# Patient Record
Sex: FEMALE | Race: WHITE | Employment: UNEMPLOYED | URBAN - METROPOLITAN AREA
[De-identification: names, ages, dates, MRNs, and addresses within clinical notes are randomized per-mention and may not be internally consistent; named-entity substitution may affect disease eponyms.]

---

## 2017-09-11 ENCOUNTER — HOSPITAL ENCOUNTER (OUTPATIENT)
Dept: ULTRASOUND IMAGING | Age: 22
Discharge: HOME | End: 2017-09-11
Attending: EMERGENCY MEDICINE | Admitting: EMERGENCY MEDICINE

## 2017-09-11 DIAGNOSIS — E04.1 THYROID NODULE: ICD-10-CM

## 2017-09-11 DIAGNOSIS — E04.1 NONTOXIC SINGLE THYROID NODULE: ICD-10-CM

## 2018-10-06 ENCOUNTER — HOSPITAL ENCOUNTER (INPATIENT)
Age: 23
LOS: 3 days | Discharge: HOME | DRG: 540 | End: 2018-10-09
Attending: OBSTETRICS & GYNECOLOGY | Admitting: OBSTETRICS & GYNECOLOGY
Payer: MEDICARE

## 2018-10-06 ENCOUNTER — ANESTHESIA (OUTPATIENT)
Dept: LABOR AND DELIVERY | Age: 23
DRG: 540 | End: 2018-10-06
Payer: MEDICARE

## 2018-10-06 ENCOUNTER — ANESTHESIA EVENT (OUTPATIENT)
Dept: LABOR AND DELIVERY | Age: 23
DRG: 540 | End: 2018-10-06
Payer: MEDICARE

## 2018-10-06 DIAGNOSIS — Z87.59 CESAREAN DELIV DUE TO PREVIOUS DIFFICULT DELIV, DELIV, CURR HOSPITALIZ: Primary | ICD-10-CM

## 2018-10-06 LAB
ABO/RH: NORMAL
AMPHETAMINE SCREEN, URINE: NORMAL
ANTIBODY SCREEN: NORMAL
BARBITURATE SCREEN URINE: NORMAL
BASOPHILS ABSOLUTE: 0 K/UL (ref 0–0.2)
BASOPHILS RELATIVE PERCENT: 0.3 %
BENZODIAZEPINE SCREEN, URINE: NORMAL
BUPRENORPHINE URINE: NORMAL
CANNABINOID SCREEN URINE: NORMAL
COCAINE METABOLITE SCREEN URINE: NORMAL
EOSINOPHILS ABSOLUTE: 0.1 K/UL (ref 0–0.6)
EOSINOPHILS RELATIVE PERCENT: 0.8 %
HCT VFR BLD CALC: 31.6 % (ref 36–48)
HEMOGLOBIN: 10.2 G/DL (ref 12–16)
HEPATITIS B SURFACE ANTIGEN INTERPRETATION: NORMAL
LYMPHOCYTES ABSOLUTE: 2.1 K/UL (ref 1–5.1)
LYMPHOCYTES RELATIVE PERCENT: 17.8 %
Lab: NORMAL
MCH RBC QN AUTO: 27.7 PG (ref 26–34)
MCHC RBC AUTO-ENTMCNC: 32.4 G/DL (ref 31–36)
MCV RBC AUTO: 85.4 FL (ref 80–100)
METHADONE SCREEN, URINE: NORMAL
MONOCYTES ABSOLUTE: 0.5 K/UL (ref 0–1.3)
MONOCYTES RELATIVE PERCENT: 3.9 %
NEUTROPHILS ABSOLUTE: 9.2 K/UL (ref 1.7–7.7)
NEUTROPHILS RELATIVE PERCENT: 77.2 %
OPIATE SCREEN URINE: NORMAL
OXYCODONE URINE: NORMAL
PDW BLD-RTO: 18 % (ref 12.4–15.4)
PH UA: 6
PHENCYCLIDINE SCREEN URINE: NORMAL
PLATELET # BLD: 182 K/UL (ref 135–450)
PMV BLD AUTO: 8.4 FL (ref 5–10.5)
PROPOXYPHENE SCREEN: NORMAL
RBC # BLD: 3.69 M/UL (ref 4–5.2)
RUBELLA ANTIBODY IGG: 118.2 IU/ML
TOTAL SYPHILLIS IGG/IGM: NORMAL
WBC # BLD: 11.9 K/UL (ref 4–11)

## 2018-10-06 PROCEDURE — 59514 CESAREAN DELIVERY ONLY: CPT | Performed by: OBSTETRICS & GYNECOLOGY

## 2018-10-06 PROCEDURE — 7100000001 HC PACU RECOVERY - ADDTL 15 MIN: Performed by: OBSTETRICS & GYNECOLOGY

## 2018-10-06 PROCEDURE — 86850 RBC ANTIBODY SCREEN: CPT

## 2018-10-06 PROCEDURE — 58740 ADHESIOLYSIS TUBE OVARY: CPT | Performed by: OBSTETRICS & GYNECOLOGY

## 2018-10-06 PROCEDURE — 2500000003 HC RX 250 WO HCPCS: Performed by: NURSE ANESTHETIST, CERTIFIED REGISTERED

## 2018-10-06 PROCEDURE — 2580000003 HC RX 258: Performed by: OBSTETRICS & GYNECOLOGY

## 2018-10-06 PROCEDURE — 87340 HEPATITIS B SURFACE AG IA: CPT

## 2018-10-06 PROCEDURE — 99223 1ST HOSP IP/OBS HIGH 75: CPT | Performed by: OBSTETRICS & GYNECOLOGY

## 2018-10-06 PROCEDURE — 86923 COMPATIBILITY TEST ELECTRIC: CPT

## 2018-10-06 PROCEDURE — 3700000001 HC ADD 15 MINUTES (ANESTHESIA): Performed by: OBSTETRICS & GYNECOLOGY

## 2018-10-06 PROCEDURE — 6360000002 HC RX W HCPCS: Performed by: OBSTETRICS & GYNECOLOGY

## 2018-10-06 PROCEDURE — 6360000002 HC RX W HCPCS

## 2018-10-06 PROCEDURE — 2709999900 HC NON-CHARGEABLE SUPPLY: Performed by: OBSTETRICS & GYNECOLOGY

## 2018-10-06 PROCEDURE — 86900 BLOOD TYPING SEROLOGIC ABO: CPT

## 2018-10-06 PROCEDURE — S0028 INJECTION, FAMOTIDINE, 20 MG: HCPCS | Performed by: OBSTETRICS & GYNECOLOGY

## 2018-10-06 PROCEDURE — 86702 HIV-2 ANTIBODY: CPT

## 2018-10-06 PROCEDURE — 3700000000 HC ANESTHESIA ATTENDED CARE: Performed by: OBSTETRICS & GYNECOLOGY

## 2018-10-06 PROCEDURE — 6360000002 HC RX W HCPCS: Performed by: NURSE ANESTHETIST, CERTIFIED REGISTERED

## 2018-10-06 PROCEDURE — 87390 HIV-1 AG IA: CPT

## 2018-10-06 PROCEDURE — 2500000003 HC RX 250 WO HCPCS: Performed by: OBSTETRICS & GYNECOLOGY

## 2018-10-06 PROCEDURE — 86701 HIV-1ANTIBODY: CPT

## 2018-10-06 PROCEDURE — 7100000000 HC PACU RECOVERY - FIRST 15 MIN: Performed by: OBSTETRICS & GYNECOLOGY

## 2018-10-06 PROCEDURE — 3609079900 HC CESAREAN SECTION: Performed by: OBSTETRICS & GYNECOLOGY

## 2018-10-06 PROCEDURE — 86901 BLOOD TYPING SEROLOGIC RH(D): CPT

## 2018-10-06 PROCEDURE — 59025 FETAL NON-STRESS TEST: CPT

## 2018-10-06 PROCEDURE — 6370000000 HC RX 637 (ALT 250 FOR IP): Performed by: OBSTETRICS & GYNECOLOGY

## 2018-10-06 PROCEDURE — 80307 DRUG TEST PRSMV CHEM ANLYZR: CPT

## 2018-10-06 PROCEDURE — 1220000000 HC SEMI PRIVATE OB R&B

## 2018-10-06 PROCEDURE — 6370000000 HC RX 637 (ALT 250 FOR IP): Performed by: NURSE ANESTHETIST, CERTIFIED REGISTERED

## 2018-10-06 PROCEDURE — 86762 RUBELLA ANTIBODY: CPT

## 2018-10-06 PROCEDURE — 86780 TREPONEMA PALLIDUM: CPT

## 2018-10-06 PROCEDURE — 85025 COMPLETE CBC W/AUTO DIFF WBC: CPT

## 2018-10-06 RX ORDER — SODIUM CHLORIDE 0.9 % (FLUSH) 0.9 %
10 SYRINGE (ML) INJECTION PRN
Status: DISCONTINUED | OUTPATIENT
Start: 2018-10-06 | End: 2018-10-06

## 2018-10-06 RX ORDER — FENTANYL CITRATE 50 UG/ML
INJECTION, SOLUTION INTRAMUSCULAR; INTRAVENOUS PRN
Status: DISCONTINUED | OUTPATIENT
Start: 2018-10-06 | End: 2018-10-07 | Stop reason: SDUPTHER

## 2018-10-06 RX ORDER — OXYCODONE HYDROCHLORIDE AND ACETAMINOPHEN 5; 325 MG/1; MG/1
1 TABLET ORAL EVERY 4 HOURS PRN
Status: DISCONTINUED | OUTPATIENT
Start: 2018-10-06 | End: 2018-10-09 | Stop reason: HOSPADM

## 2018-10-06 RX ORDER — METOCLOPRAMIDE HYDROCHLORIDE 5 MG/ML
10 INJECTION INTRAMUSCULAR; INTRAVENOUS ONCE
Status: COMPLETED | OUTPATIENT
Start: 2018-10-06 | End: 2018-10-06

## 2018-10-06 RX ORDER — SODIUM CHLORIDE, SODIUM LACTATE, POTASSIUM CHLORIDE, CALCIUM CHLORIDE 600; 310; 30; 20 MG/100ML; MG/100ML; MG/100ML; MG/100ML
INJECTION, SOLUTION INTRAVENOUS CONTINUOUS
Status: DISCONTINUED | OUTPATIENT
Start: 2018-10-06 | End: 2018-10-09 | Stop reason: HOSPADM

## 2018-10-06 RX ORDER — NALOXONE HYDROCHLORIDE 0.4 MG/ML
0.4 INJECTION, SOLUTION INTRAMUSCULAR; INTRAVENOUS; SUBCUTANEOUS PRN
Status: DISCONTINUED | OUTPATIENT
Start: 2018-10-06 | End: 2018-10-09 | Stop reason: HOSPADM

## 2018-10-06 RX ORDER — ACETAMINOPHEN 10 MG/ML
1000 INJECTION, SOLUTION INTRAVENOUS ONCE
Status: COMPLETED | OUTPATIENT
Start: 2018-10-06 | End: 2018-10-06

## 2018-10-06 RX ORDER — METOCLOPRAMIDE HYDROCHLORIDE 5 MG/ML
10 INJECTION INTRAMUSCULAR; INTRAVENOUS ONCE
Status: DISCONTINUED | OUTPATIENT
Start: 2018-10-06 | End: 2018-10-06 | Stop reason: SDUPTHER

## 2018-10-06 RX ORDER — 0.9 % SODIUM CHLORIDE 0.9 %
250 INTRAVENOUS SOLUTION INTRAVENOUS ONCE
Status: DISCONTINUED | OUTPATIENT
Start: 2018-10-06 | End: 2018-10-09 | Stop reason: HOSPADM

## 2018-10-06 RX ORDER — SODIUM CHLORIDE 0.9 % (FLUSH) 0.9 %
SYRINGE (ML) INJECTION
Status: DISPENSED
Start: 2018-10-06 | End: 2018-10-07

## 2018-10-06 RX ORDER — SODIUM CHLORIDE 0.9 % (FLUSH) 0.9 %
10 SYRINGE (ML) INJECTION PRN
Status: DISCONTINUED | OUTPATIENT
Start: 2018-10-06 | End: 2018-10-09 | Stop reason: HOSPADM

## 2018-10-06 RX ORDER — OXYCODONE HYDROCHLORIDE AND ACETAMINOPHEN 5; 325 MG/1; MG/1
2 TABLET ORAL EVERY 4 HOURS PRN
Status: DISCONTINUED | OUTPATIENT
Start: 2018-10-06 | End: 2018-10-06

## 2018-10-06 RX ORDER — DIPHENHYDRAMINE HYDROCHLORIDE 50 MG/ML
25 INJECTION INTRAMUSCULAR; INTRAVENOUS EVERY 6 HOURS PRN
Status: DISCONTINUED | OUTPATIENT
Start: 2018-10-06 | End: 2018-10-09 | Stop reason: HOSPADM

## 2018-10-06 RX ORDER — SODIUM CHLORIDE, SODIUM LACTATE, POTASSIUM CHLORIDE, CALCIUM CHLORIDE 600; 310; 30; 20 MG/100ML; MG/100ML; MG/100ML; MG/100ML
INJECTION, SOLUTION INTRAVENOUS CONTINUOUS
Status: DISCONTINUED | OUTPATIENT
Start: 2018-10-06 | End: 2018-10-06

## 2018-10-06 RX ORDER — IBUPROFEN 400 MG/1
800 TABLET ORAL EVERY 8 HOURS PRN
Status: DISCONTINUED | OUTPATIENT
Start: 2018-10-06 | End: 2018-10-09 | Stop reason: HOSPADM

## 2018-10-06 RX ORDER — LANOLIN 100 %
OINTMENT (GRAM) TOPICAL
Status: DISCONTINUED | OUTPATIENT
Start: 2018-10-06 | End: 2018-10-09 | Stop reason: HOSPADM

## 2018-10-06 RX ORDER — SODIUM CHLORIDE, SODIUM LACTATE, POTASSIUM CHLORIDE, CALCIUM CHLORIDE 600; 310; 30; 20 MG/100ML; MG/100ML; MG/100ML; MG/100ML
INJECTION, SOLUTION INTRAVENOUS
Status: DISPENSED
Start: 2018-10-06 | End: 2018-10-07

## 2018-10-06 RX ORDER — SODIUM CHLORIDE 0.9 % (FLUSH) 0.9 %
10 SYRINGE (ML) INJECTION EVERY 12 HOURS SCHEDULED
Status: DISCONTINUED | OUTPATIENT
Start: 2018-10-06 | End: 2018-10-09 | Stop reason: HOSPADM

## 2018-10-06 RX ORDER — ONDANSETRON 2 MG/ML
4 INJECTION INTRAMUSCULAR; INTRAVENOUS EVERY 6 HOURS PRN
Status: DISCONTINUED | OUTPATIENT
Start: 2018-10-06 | End: 2018-10-09 | Stop reason: HOSPADM

## 2018-10-06 RX ORDER — TRISODIUM CITRATE DIHYDRATE AND CITRIC ACID MONOHYDRATE 500; 334 MG/5ML; MG/5ML
30 SOLUTION ORAL ONCE
Status: COMPLETED | OUTPATIENT
Start: 2018-10-06 | End: 2018-10-06

## 2018-10-06 RX ORDER — SODIUM CHLORIDE 0.9 % (FLUSH) 0.9 %
10 SYRINGE (ML) INJECTION EVERY 12 HOURS SCHEDULED
Status: DISCONTINUED | OUTPATIENT
Start: 2018-10-06 | End: 2018-10-06

## 2018-10-06 RX ORDER — MIDAZOLAM HYDROCHLORIDE 1 MG/ML
INJECTION INTRAMUSCULAR; INTRAVENOUS PRN
Status: DISCONTINUED | OUTPATIENT
Start: 2018-10-06 | End: 2018-10-07 | Stop reason: SDUPTHER

## 2018-10-06 RX ORDER — ACETAMINOPHEN 325 MG/1
650 TABLET ORAL EVERY 4 HOURS PRN
Status: DISCONTINUED | OUTPATIENT
Start: 2018-10-06 | End: 2018-10-09 | Stop reason: HOSPADM

## 2018-10-06 RX ORDER — EPHEDRINE SULFATE 50 MG/ML
INJECTION, SOLUTION INTRAVENOUS PRN
Status: DISCONTINUED | OUTPATIENT
Start: 2018-10-06 | End: 2018-10-07 | Stop reason: SDUPTHER

## 2018-10-06 RX ADMIN — SODIUM CHLORIDE, POTASSIUM CHLORIDE, SODIUM LACTATE AND CALCIUM CHLORIDE: 600; 310; 30; 20 INJECTION, SOLUTION INTRAVENOUS at 12:20

## 2018-10-06 RX ADMIN — SODIUM CHLORIDE, POTASSIUM CHLORIDE, SODIUM LACTATE AND CALCIUM CHLORIDE: 600; 310; 30; 20 INJECTION, SOLUTION INTRAVENOUS at 13:00

## 2018-10-06 RX ADMIN — Medication 2 G: at 13:37

## 2018-10-06 RX ADMIN — FAMOTIDINE 20 MG: 10 INJECTION, SOLUTION INTRAVENOUS at 13:00

## 2018-10-06 RX ADMIN — ONDANSETRON 4 MG: 2 INJECTION INTRAMUSCULAR; INTRAVENOUS at 19:37

## 2018-10-06 RX ADMIN — SODIUM CITRATE AND CITRIC ACID MONOHYDRATE 30 ML: 500; 334 SOLUTION ORAL at 13:02

## 2018-10-06 RX ADMIN — EPHEDRINE SULFATE 10 MG: 50 INJECTION INTRAMUSCULAR; INTRAVENOUS; SUBCUTANEOUS at 13:38

## 2018-10-06 RX ADMIN — FENTANYL CITRATE 90 MCG: 50 INJECTION INTRAMUSCULAR; INTRAVENOUS at 13:50

## 2018-10-06 RX ADMIN — ACETAMINOPHEN 1000 MG: 10 INJECTION, SOLUTION INTRAVENOUS at 18:41

## 2018-10-06 RX ADMIN — MIDAZOLAM 1 MG: 1 INJECTION INTRAMUSCULAR; INTRAVENOUS at 13:30

## 2018-10-06 RX ADMIN — IBUPROFEN 800 MG: 400 TABLET, FILM COATED ORAL at 22:04

## 2018-10-06 RX ADMIN — SODIUM CHLORIDE, POTASSIUM CHLORIDE, SODIUM LACTATE AND CALCIUM CHLORIDE: 600; 310; 30; 20 INJECTION, SOLUTION INTRAVENOUS at 18:40

## 2018-10-06 RX ADMIN — METOCLOPRAMIDE 10 MG: 5 INJECTION, SOLUTION INTRAMUSCULAR; INTRAVENOUS at 13:00

## 2018-10-06 RX ADMIN — MIDAZOLAM 1 MG: 1 INJECTION INTRAMUSCULAR; INTRAVENOUS at 13:15

## 2018-10-06 ASSESSMENT — PULMONARY FUNCTION TESTS
PIF_VALUE: 0

## 2018-10-06 ASSESSMENT — PAIN SCALES - GENERAL: PAINLEVEL_OUTOF10: 6

## 2018-10-06 NOTE — PLAN OF CARE
Problem: Urinary Retention:  Intervention: Manage urinary catheter  Culp cath draining with gravity.

## 2018-10-07 LAB
HCT VFR BLD CALC: 24.1 % (ref 36–48)
HCT VFR BLD CALC: 24.7 % (ref 36–48)
HEMOGLOBIN: 7.7 G/DL (ref 12–16)
HEMOGLOBIN: 7.9 G/DL (ref 12–16)
HEPATITIS C ANTIBODY INTERPRETATION: NORMAL
HIV AG/AB: NORMAL
HIV ANTIGEN: NORMAL
HIV-1 ANTIBODY: NORMAL
HIV-2 AB: NORMAL
MCH RBC QN AUTO: 27.4 PG (ref 26–34)
MCH RBC QN AUTO: 27.7 PG (ref 26–34)
MCHC RBC AUTO-ENTMCNC: 31.9 G/DL (ref 31–36)
MCHC RBC AUTO-ENTMCNC: 32.1 G/DL (ref 31–36)
MCV RBC AUTO: 85.9 FL (ref 80–100)
MCV RBC AUTO: 86.2 FL (ref 80–100)
PDW BLD-RTO: 18.3 % (ref 12.4–15.4)
PDW BLD-RTO: 18.6 % (ref 12.4–15.4)
PLATELET # BLD: 131 K/UL (ref 135–450)
PLATELET # BLD: 150 K/UL (ref 135–450)
PMV BLD AUTO: 7.9 FL (ref 5–10.5)
PMV BLD AUTO: 8.2 FL (ref 5–10.5)
RBC # BLD: 2.79 M/UL (ref 4–5.2)
RBC # BLD: 2.88 M/UL (ref 4–5.2)
WBC # BLD: 11 K/UL (ref 4–11)
WBC # BLD: 11.1 K/UL (ref 4–11)

## 2018-10-07 PROCEDURE — 99024 POSTOP FOLLOW-UP VISIT: CPT | Performed by: OBSTETRICS & GYNECOLOGY

## 2018-10-07 PROCEDURE — 85027 COMPLETE CBC AUTOMATED: CPT

## 2018-10-07 PROCEDURE — 6370000000 HC RX 637 (ALT 250 FOR IP): Performed by: OBSTETRICS & GYNECOLOGY

## 2018-10-07 PROCEDURE — 94664 DEMO&/EVAL PT USE INHALER: CPT

## 2018-10-07 PROCEDURE — 86803 HEPATITIS C AB TEST: CPT

## 2018-10-07 PROCEDURE — 1220000000 HC SEMI PRIVATE OB R&B

## 2018-10-07 RX ORDER — POLYETHYLENE GLYCOL 3350 17 G/17G
17 POWDER, FOR SOLUTION ORAL DAILY
Status: DISCONTINUED | OUTPATIENT
Start: 2018-10-07 | End: 2018-10-09 | Stop reason: HOSPADM

## 2018-10-07 RX ADMIN — IBUPROFEN 800 MG: 400 TABLET, FILM COATED ORAL at 06:39

## 2018-10-07 RX ADMIN — IBUPROFEN 800 MG: 400 TABLET, FILM COATED ORAL at 23:49

## 2018-10-07 RX ADMIN — IBUPROFEN 800 MG: 400 TABLET, FILM COATED ORAL at 14:54

## 2018-10-07 RX ADMIN — OXYCODONE AND ACETAMINOPHEN 1 TABLET: 5; 325 TABLET ORAL at 01:24

## 2018-10-07 RX ADMIN — OXYCODONE AND ACETAMINOPHEN 1 TABLET: 5; 325 TABLET ORAL at 11:36

## 2018-10-07 RX ADMIN — OXYCODONE AND ACETAMINOPHEN 1 TABLET: 5; 325 TABLET ORAL at 16:07

## 2018-10-07 RX ADMIN — POLYETHYLENE GLYCOL 3350 17 G: 17 POWDER, FOR SOLUTION ORAL at 08:13

## 2018-10-07 RX ADMIN — OXYCODONE AND ACETAMINOPHEN 1 TABLET: 5; 325 TABLET ORAL at 06:39

## 2018-10-07 RX ADMIN — OXYCODONE AND ACETAMINOPHEN 1 TABLET: 5; 325 TABLET ORAL at 20:12

## 2018-10-07 ASSESSMENT — PAIN SCALES - GENERAL
PAINLEVEL_OUTOF10: 8
PAINLEVEL_OUTOF10: 7
PAINLEVEL_OUTOF10: 6
PAINLEVEL_OUTOF10: 4
PAINLEVEL_OUTOF10: 8
PAINLEVEL_OUTOF10: 7
PAINLEVEL_OUTOF10: 7
PAINLEVEL_OUTOF10: 10

## 2018-10-07 ASSESSMENT — PAIN DESCRIPTION - PROGRESSION
CLINICAL_PROGRESSION: GRADUALLY IMPROVING

## 2018-10-07 ASSESSMENT — PAIN DESCRIPTION - ORIENTATION: ORIENTATION: LOWER

## 2018-10-07 ASSESSMENT — PAIN DESCRIPTION - DESCRIPTORS: DESCRIPTORS: ACHING;CRAMPING;DISCOMFORT;DULL;SORE

## 2018-10-07 ASSESSMENT — PAIN DESCRIPTION - PAIN TYPE: TYPE: ACUTE PAIN;SURGICAL PAIN

## 2018-10-07 ASSESSMENT — PAIN DESCRIPTION - FREQUENCY: FREQUENCY: INTERMITTENT

## 2018-10-07 ASSESSMENT — PAIN DESCRIPTION - LOCATION: LOCATION: ABDOMEN

## 2018-10-07 NOTE — FLOWSHEET NOTE
Spoke with anesthesia Poppy Sullivan and Dr. Savanah Ponce. Okay to give po motrin and percocet at this time.

## 2018-10-07 NOTE — FLOWSHEET NOTE
RN to room to introduce self to patient and update board. Mother concerned with infant hypoglycemia. Questions answered and informed mom will keep up to date with all interventions and treatment planned.

## 2018-10-08 PROCEDURE — 1220000000 HC SEMI PRIVATE OB R&B

## 2018-10-08 PROCEDURE — 6370000000 HC RX 637 (ALT 250 FOR IP): Performed by: OBSTETRICS & GYNECOLOGY

## 2018-10-08 PROCEDURE — 90471 IMMUNIZATION ADMIN: CPT | Performed by: OBSTETRICS & GYNECOLOGY

## 2018-10-08 PROCEDURE — 6360000002 HC RX W HCPCS: Performed by: OBSTETRICS & GYNECOLOGY

## 2018-10-08 PROCEDURE — 0DNW0ZZ RELEASE PERITONEUM, OPEN APPROACH: ICD-10-PCS | Performed by: OBSTETRICS & GYNECOLOGY

## 2018-10-08 PROCEDURE — 90715 TDAP VACCINE 7 YRS/> IM: CPT | Performed by: OBSTETRICS & GYNECOLOGY

## 2018-10-08 RX ORDER — MAGNESIUM HYDROXIDE/ALUMINUM HYDROXICE/SIMETHICONE 120; 1200; 1200 MG/30ML; MG/30ML; MG/30ML
30 SUSPENSION ORAL ONCE
Status: COMPLETED | OUTPATIENT
Start: 2018-10-08 | End: 2018-10-08

## 2018-10-08 RX ORDER — OXYCODONE HYDROCHLORIDE AND ACETAMINOPHEN 5; 325 MG/1; MG/1
2 TABLET ORAL EVERY 4 HOURS PRN
Status: DISCONTINUED | OUTPATIENT
Start: 2018-10-08 | End: 2018-10-09 | Stop reason: HOSPADM

## 2018-10-08 RX ADMIN — IBUPROFEN 800 MG: 400 TABLET, FILM COATED ORAL at 10:46

## 2018-10-08 RX ADMIN — OXYCODONE AND ACETAMINOPHEN 1 TABLET: 5; 325 TABLET ORAL at 00:09

## 2018-10-08 RX ADMIN — ALUMINUM HYDROXIDE, MAGNESIUM HYDROXIDE, AND SIMETHICONE 30 ML: 200; 200; 20 SUSPENSION ORAL at 22:03

## 2018-10-08 RX ADMIN — IBUPROFEN 800 MG: 400 TABLET, FILM COATED ORAL at 10:47

## 2018-10-08 RX ADMIN — OXYCODONE AND ACETAMINOPHEN 1 TABLET: 5; 325 TABLET ORAL at 10:59

## 2018-10-08 RX ADMIN — OXYCODONE HYDROCHLORIDE AND ACETAMINOPHEN 2 TABLET: 5; 325 TABLET ORAL at 19:12

## 2018-10-08 RX ADMIN — OXYCODONE AND ACETAMINOPHEN 1 TABLET: 5; 325 TABLET ORAL at 05:27

## 2018-10-08 RX ADMIN — IBUPROFEN 800 MG: 400 TABLET, FILM COATED ORAL at 19:13

## 2018-10-08 RX ADMIN — POLYETHYLENE GLYCOL 3350 17 G: 17 POWDER, FOR SOLUTION ORAL at 10:45

## 2018-10-08 RX ADMIN — TETANUS TOXOID, REDUCED DIPHTHERIA TOXOID AND ACELLULAR PERTUSSIS VACCINE, ADSORBED 0.5 ML: 5; 2.5; 8; 8; 2.5 SUSPENSION INTRAMUSCULAR at 15:23

## 2018-10-08 RX ADMIN — OXYCODONE HYDROCHLORIDE AND ACETAMINOPHEN 2 TABLET: 5; 325 TABLET ORAL at 15:22

## 2018-10-08 RX ADMIN — OXYCODONE AND ACETAMINOPHEN 1 TABLET: 5; 325 TABLET ORAL at 10:45

## 2018-10-08 ASSESSMENT — PAIN SCALES - GENERAL
PAINLEVEL_OUTOF10: 9
PAINLEVEL_OUTOF10: 5
PAINLEVEL_OUTOF10: 8
PAINLEVEL_OUTOF10: 0
PAINLEVEL_OUTOF10: 7
PAINLEVEL_OUTOF10: 7

## 2018-10-08 ASSESSMENT — PAIN DESCRIPTION - ORIENTATION: ORIENTATION: MID;LOWER

## 2018-10-08 ASSESSMENT — PAIN DESCRIPTION - PAIN TYPE: TYPE: SURGICAL PAIN

## 2018-10-08 ASSESSMENT — PAIN DESCRIPTION - DESCRIPTORS: DESCRIPTORS: ACHING;CRAMPING

## 2018-10-08 ASSESSMENT — PAIN DESCRIPTION - LOCATION: LOCATION: ABDOMEN

## 2018-10-08 ASSESSMENT — PAIN DESCRIPTION - FREQUENCY: FREQUENCY: INTERMITTENT

## 2018-10-08 NOTE — FLOWSHEET NOTE
Patient complaining of mid upper abdominal pain that was there prior to delivery but is worse right now. Passing flatus. Hypoactive bowel sounds. Has not had bowel movement post delivery. Dr. Alfredo Blackburn notified, will try maalox 30mg.

## 2018-10-08 NOTE — H&P
[address] [place] [address] [facility]                      PREOPERATIVE HISTORY AND PHYSICAL    PATIENT NAME: Tiffanie Palacio                   :         1995  MED REC NO:   [id number]                          ROOM:       2259  ACCOUNT NO:   [de-identified]                           ADMIT DATE:  10/06/2018  PROVIDER:     Denita Omer MD      DATE OF ADMISSION AND PLANNED PROCEDURE:  10/06/2018    PLANNED PROCEDURE:  Repeat  section. PREOPERATIVE DIAGNOSES:  1. Intrauterine pregnancy at 35+ weeks' gestation. 2.  Spontaneous onset of labor. 3.  No prenatal care. 4.  Previous  sections x5. HISTORY:  The patient is a 47-year-old single, white,  8, para  6, elective AB 2, spontaneous AB 1, who presents to Labor and Delivery  with regular contractions. She states that she has been having the  contractions x3 days but denies any vaginal bleeding. She states that  she \"lost her mucus plug today\" and decided to come in. She has not  seen anyone whatsoever for this pregnancy. Her last _____ course was  about two weeks ago. On exam, she is found to be 3 cm dilated, 100%  effacement with a bulging bag of water. Bedside ultrasound performed  by myself found a fetus that is in the vertex position, anterior low  lying placenta. Estimated gestational age by femur length and BPD  were 35-1/2 weeks' gestation. I am unable to ascertain for sure if  this represents placenta accreta by my limited ultrasound here in  Labor and Delivery. The patient has been advised about the risk of  the surgery of repeat  section. I have recommended that we  proceed with the repeat  section. She understands my  counseling as far as the risk of the possible damage to her bowel, her  bladder, and her ureters which could result to more extensive surgery.   We also discussed risks of infection and/or bleeding and/or Prenatal labs have been drawn. Her preliminary CBC  finds a hemoglobin and hematocrit of 10.2 and 31.6% respectively. ASSESSMENT AND PLAN:  A 47-year-old multiparous female with five  previous  sections. She is now in early labor and understands  that we need to proceed with a repeat  section. She does have  an anterior low lying placenta. I have discussed with Dr. Mau Ferro and  she has been so kind to agree to come in to provide assistance just in  the event that this could represent placenta accreta and get into some  bleeding difficulties. We will proceed with surgery here shortly at  [address] [place] [address] and Delivery.         Radha Bennett MD    D: 10/06/2018 13:11:25       T: 10/06/2018 17:59:51     [email or web address]  Job#: [id number]     Doc#: [id number]    CC:

## 2018-10-09 ENCOUNTER — ANESTHESIA EVENT (OUTPATIENT)
Dept: POSTPARTUM | Age: 23
End: 2018-10-09

## 2018-10-09 ENCOUNTER — ANESTHESIA (OUTPATIENT)
Dept: POSTPARTUM | Age: 23
End: 2018-10-09

## 2018-10-09 VITALS
RESPIRATION RATE: 18 BRPM | OXYGEN SATURATION: 97 % | DIASTOLIC BLOOD PRESSURE: 65 MMHG | TEMPERATURE: 97.4 F | HEART RATE: 84 BPM | SYSTOLIC BLOOD PRESSURE: 113 MMHG

## 2018-10-09 PROCEDURE — 6370000000 HC RX 637 (ALT 250 FOR IP): Performed by: OBSTETRICS & GYNECOLOGY

## 2018-10-09 PROCEDURE — 99999 PR OFFICE/OUTPT VISIT,PROCEDURE ONLY: CPT | Performed by: OBSTETRICS & GYNECOLOGY

## 2018-10-09 PROCEDURE — 2500000003 HC RX 250 WO HCPCS

## 2018-10-09 RX ORDER — IBUPROFEN 800 MG/1
800 TABLET ORAL EVERY 8 HOURS PRN
Qty: 21 TABLET | Refills: 0 | Status: SHIPPED | OUTPATIENT
Start: 2018-10-09 | End: 2018-11-11 | Stop reason: ALTCHOICE

## 2018-10-09 RX ORDER — OXYCODONE HYDROCHLORIDE AND ACETAMINOPHEN 5; 325 MG/1; MG/1
1 TABLET ORAL EVERY 8 HOURS PRN
Qty: 21 TABLET | Refills: 0 | Status: SHIPPED | OUTPATIENT
Start: 2018-10-09 | End: 2018-10-16

## 2018-10-09 RX ORDER — PSEUDOEPHEDRINE HCL 30 MG
100 TABLET ORAL 2 TIMES DAILY
Qty: 14 CAPSULE | Refills: 0 | Status: SHIPPED | OUTPATIENT
Start: 2018-10-09 | End: 2021-08-06

## 2018-10-09 RX ADMIN — OXYCODONE HYDROCHLORIDE AND ACETAMINOPHEN 2 TABLET: 5; 325 TABLET ORAL at 13:54

## 2018-10-09 RX ADMIN — OXYCODONE HYDROCHLORIDE AND ACETAMINOPHEN 2 TABLET: 5; 325 TABLET ORAL at 05:34

## 2018-10-09 RX ADMIN — POLYETHYLENE GLYCOL 3350 17 G: 17 POWDER, FOR SOLUTION ORAL at 11:44

## 2018-10-09 RX ADMIN — IBUPROFEN 800 MG: 400 TABLET, FILM COATED ORAL at 05:31

## 2018-10-09 RX ADMIN — OXYCODONE HYDROCHLORIDE AND ACETAMINOPHEN 2 TABLET: 5; 325 TABLET ORAL at 09:40

## 2018-10-09 RX ADMIN — IBUPROFEN 800 MG: 400 TABLET, FILM COATED ORAL at 13:54

## 2018-10-09 RX ADMIN — OXYCODONE HYDROCHLORIDE AND ACETAMINOPHEN 2 TABLET: 5; 325 TABLET ORAL at 00:27

## 2018-10-09 ASSESSMENT — PAIN DESCRIPTION - PROGRESSION: CLINICAL_PROGRESSION: NOT CHANGED

## 2018-10-09 ASSESSMENT — PAIN DESCRIPTION - DESCRIPTORS: DESCRIPTORS: BURNING

## 2018-10-09 ASSESSMENT — PAIN DESCRIPTION - LOCATION: LOCATION: ABDOMEN;BREAST

## 2018-10-09 ASSESSMENT — PAIN DESCRIPTION - FREQUENCY: FREQUENCY: INTERMITTENT

## 2018-10-09 ASSESSMENT — PAIN DESCRIPTION - ONSET: ONSET: GRADUAL

## 2018-10-09 ASSESSMENT — PAIN SCALES - GENERAL
PAINLEVEL_OUTOF10: 8
PAINLEVEL_OUTOF10: 8
PAINLEVEL_OUTOF10: 7

## 2018-10-09 ASSESSMENT — PAIN DESCRIPTION - ORIENTATION: ORIENTATION: LOWER

## 2018-10-09 ASSESSMENT — PAIN DESCRIPTION - PAIN TYPE: TYPE: SURGICAL PAIN
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
## 2018-10-09 NOTE — DISCHARGE SUMMARY
Department of Obstetrics and Gynecology  Postpartum Discharge Summary      Admit Date: 10/6/2018    Admit Diagnosis: IUP @ 28 0/7 w, h/o CS x4, active labor     Discharge Date: 10/9/18    Discharge Diagnoses: repeat C section     Danni Davis   Home Medication Instructions SUSHIL:[id number]    Printed on:10/09/18 1215   Medication Information                      acetaminophen (APAP EXTRA STRENGTH) 500 MG tablet  Take 1 tablet by mouth every 6 hours as needed for Pain             docusate (COLACE, DULCOLAX) 100 MG CAPS  Take 100 mg by mouth 2 times daily             ibuprofen (ADVIL;MOTRIN) 800 MG tablet  Take 1 tablet by mouth every 8 hours as needed for Pain             oxyCODONE-acetaminophen (PERCOCET) 5-325 MG per tablet  Take 1 tablet by mouth every 8 hours as needed for Pain for up to 7 days. .                 Service: Obstetrics    Consults: none    Significant Diagnostic Studies: none    Postpartum complications: none     Condition at Discharge: good    Hospital Course: uncomplicated    Discharge Instructions: Activity: as tolerated    Diet: regular diet    Instructions: No intercourse and nothing in the vagina for 6 weeks. Do not drive while using pain medications.  Keep any wounds clean and dry    Discharge to: Home    Disposition / Follow up: Return to clinic in 1 week    Home Health Nurse visit within 24-48 h if qualifies     Data:  Weight   Information for the patient's :  [place], Baby Boy [place] [[id number]]        Apgars   Information for the patient's :  Di Nickerson with mother    Electronically signed by Adri Nieves MD on 10/9/2018 at 12:15 PM

## 2018-10-09 NOTE — FLOWSHEET NOTE
Postpartum and infant care teaching completed and forms signed by patient. Copy witnessed by RN and given to patient. Patient verbalized understanding of all teaching points. Prescriptions given if applicable. Patient plans to follow-up with [facility] Provider as instructed on 10/12/18 and in 6 weeks at [facility] clinic. Patient verbalizes understanding of discharge instructions and denies further questions. ID bands checked. Mother's ID band and one of baby's ID bands removed and taped to footprint sheet, signed by patient and witnessed by RN. Pt called  and is waiting for him to pick her up. Pt denies needs at this time. Pt will notify when  here to be wheeled out.

## 2018-10-10 LAB
BLOOD BANK DISPENSE STATUS: NORMAL
BLOOD BANK DISPENSE STATUS: NORMAL
BLOOD BANK PRODUCT CODE: NORMAL
BLOOD BANK PRODUCT CODE: NORMAL
BPU ID: NORMAL
BPU ID: NORMAL
DESCRIPTION BLOOD BANK: NORMAL
DESCRIPTION BLOOD BANK: NORMAL

## 2018-10-14 ASSESSMENT — LIFESTYLE VARIABLES: SMOKING_STATUS: 1

## 2018-10-14 NOTE — ANESTHESIA PRE PROCEDURE
Department of Anesthesiology  Preprocedure Note       Name:  Edwin Parikh   Age:  20 y.o.  :  1995                                          MRN:  [id number]         Date:  10/14/2018      Surgeon: * No surgeons listed *    Procedure: * No procedures listed *    Medications prior to admission:   Prior to Admission medications    Medication Sig Start Date End Date Taking? Authorizing Provider   oxyCODONE-acetaminophen (PERCOCET) 5-325 MG per tablet Take 1 tablet by mouth every 8 hours as needed for Pain for up to 7 days. . 10/9/18 10/16/18 Yes Sonali Allan MD   ibuprofen (ADVIL;MOTRIN) 800 MG tablet Take 1 tablet by mouth every 8 hours as needed for Pain 10/9/18 10/16/18 Yes Sonali Allan MD   docusate (COLACE, DULCOLAX) 100 MG CAPS Take 100 mg by mouth 2 times daily 10/9/18  Yes Sonali Allan MD   acetaminophen (APAP EXTRA STRENGTH) 500 MG tablet Take 1 tablet by mouth every 6 hours as needed for Pain 17   Abida James MD       Current medications:    No current facility-administered medications for this encounter. Current Outpatient Prescriptions   Medication Sig Dispense Refill    oxyCODONE-acetaminophen (PERCOCET) 5-325 MG per tablet Take 1 tablet by mouth every 8 hours as needed for Pain for up to 7 days. . 21 tablet 0    ibuprofen (ADVIL;MOTRIN) 800 MG tablet Take 1 tablet by mouth every 8 hours as needed for Pain 21 tablet 0    docusate (COLACE, DULCOLAX) 100 MG CAPS Take 100 mg by mouth 2 times daily 14 capsule 0    acetaminophen (APAP EXTRA STRENGTH) 500 MG tablet Take 1 tablet by mouth every 6 hours as needed for Pain 30 tablet 0       Allergies:     Allergies   Allergen Reactions    Red Dye        Problem List:    Patient Active Problem List   Diagnosis Code    Previous  section Z98.891     deliv due to previous difficult deliv, deliv, curr hospitaliz O99.89, Z87.59       Past Medical History:        Diagnosis Date    Anemia     Gastric ulcer, unspecified as acute or chronic, without mention of hemorrhage, perforation, or obstruction     Rh incompatibility        Past Surgical History:        Procedure Laterality Date     SECTION      TONSILLECTOMY      TYMPANOSTOMY TUBE PLACEMENT         Social History:    Social History   Substance Use Topics    Smoking status: Former Smoker     Packs/day: 1.00    Smokeless tobacco: Former User    Alcohol use 1.2 oz/week     2 Cans of beer per week      Comment: daily                                 Counseling given: Not Answered      Vital Signs (Current):   Vitals:    10/08/18 1519 10/08/18 1655 10/08/18 2352 10/09/18 0756   BP: 116/73  (!) 99/58 113/65   Pulse: 80 80 100 84   Resp:    Temp: 97.9 °F (36.6 °C)  98.2 °F (36.8 °C) 97.4 °F (36.3 °C)   TempSrc: Oral  Axillary Oral   SpO2:   97%                                               BP Readings from Last 3 Encounters:   10/09/18 113/65   17 124/62   14 130/74       NPO Status:                                                                                 BMI:   Wt Readings from Last 3 Encounters:   17 191 lb 9.3 oz (86.9 kg)   14 145 lb (65.8 kg)   12 145 lb (65.8 kg) (78 %, Z= 0.77)*     * Growth percentiles are based on CDC 2-20 Years data.      There is no height or weight on file to calculate BMI.    CBC:   Lab Results   Component Value Date    WBC 11.1 10/07/2018    RBC 2.88 10/07/2018    HGB 7.9 10/07/2018    HCT 24.7 10/07/2018    MCV 85.9 10/07/2018    RDW 18.3 10/07/2018     10/07/2018       CMP:   Lab Results   Component Value Date     2017    K 4.6 2017    CL 99 2017    CO2 22 2017    BUN 7 2017    CREATININE <0.5 2017    GFRAA >60 2017    GFRAA >60 2011    AGRATIO 1.3 2017    LABGLOM >60 2017    GLUCOSE 88 2017    PROT 7.7 2017    PROT 7.5 2011    CALCIUM 8.9 2017    BILITOT 0.3 2017    ALKPHOS 74

## 2018-11-11 ENCOUNTER — HOSPITAL ENCOUNTER (EMERGENCY)
Age: 23
Discharge: HOME | End: 2018-11-12
Attending: EMERGENCY MEDICINE
Payer: MEDICARE

## 2018-11-11 ENCOUNTER — APPOINTMENT (OUTPATIENT)
Dept: CT IMAGING | Age: 23
End: 2018-11-11
Payer: MEDICARE

## 2018-11-11 VITALS
HEART RATE: 90 BPM | HEIGHT: 64 IN | TEMPERATURE: 97.8 F | BODY MASS INDEX: 31.65 KG/M2 | SYSTOLIC BLOOD PRESSURE: 111 MMHG | WEIGHT: 185.41 LBS | DIASTOLIC BLOOD PRESSURE: 56 MMHG | OXYGEN SATURATION: 100 % | RESPIRATION RATE: 22 BRPM

## 2018-11-11 DIAGNOSIS — G89.18 POST-OPERATIVE PAIN: Primary | ICD-10-CM

## 2018-11-11 LAB
A/G RATIO: 1.4 (ref 1.1–2.2)
ALBUMIN SERPL-MCNC: 4 G/DL (ref 3.4–5)
ALP BLD-CCNC: 84 U/L (ref 40–129)
ALT SERPL-CCNC: 37 U/L (ref 10–40)
ANION GAP SERPL CALCULATED.3IONS-SCNC: 12 MMOL/L (ref 3–16)
AST SERPL-CCNC: 29 U/L (ref 15–37)
BILIRUB SERPL-MCNC: 0.4 MG/DL (ref 0–1)
BUN BLDV-MCNC: 9 MG/DL (ref 7–20)
CALCIUM SERPL-MCNC: 9.1 MG/DL (ref 8.3–10.6)
CHLORIDE BLD-SCNC: 102 MMOL/L (ref 99–110)
CO2: 23 MMOL/L (ref 21–32)
CREAT SERPL-MCNC: 0.6 MG/DL (ref 0.6–1.1)
GFR AFRICAN AMERICAN: >60
GFR NON-AFRICAN AMERICAN: >60
GLOBULIN: 2.8 G/DL
GLUCOSE BLD-MCNC: 95 MG/DL (ref 70–99)
HCG QUALITATIVE: NEGATIVE
HCT VFR BLD CALC: 29.9 % (ref 36–48)
HEMOGLOBIN: 9.6 G/DL (ref 12–16)
MCH RBC QN AUTO: 25.9 PG (ref 26–34)
MCHC RBC AUTO-ENTMCNC: 32 G/DL (ref 31–36)
MCV RBC AUTO: 80.9 FL (ref 80–100)
PDW BLD-RTO: 18.2 % (ref 12.4–15.4)
PLATELET # BLD: 205 K/UL (ref 135–450)
PMV BLD AUTO: 7.9 FL (ref 5–10.5)
POTASSIUM SERPL-SCNC: 3.7 MMOL/L (ref 3.5–5.1)
RBC # BLD: 3.7 M/UL (ref 4–5.2)
SODIUM BLD-SCNC: 137 MMOL/L (ref 136–145)
TOTAL PROTEIN: 6.8 G/DL (ref 6.4–8.2)
WBC # BLD: 8.7 K/UL (ref 4–11)

## 2018-11-11 PROCEDURE — 96361 HYDRATE IV INFUSION ADD-ON: CPT

## 2018-11-11 PROCEDURE — 96375 TX/PRO/DX INJ NEW DRUG ADDON: CPT

## 2018-11-11 PROCEDURE — 74177 CT ABD & PELVIS W/CONTRAST: CPT

## 2018-11-11 PROCEDURE — 85027 COMPLETE CBC AUTOMATED: CPT

## 2018-11-11 PROCEDURE — 6360000004 HC RX CONTRAST MEDICATION: Performed by: EMERGENCY MEDICINE

## 2018-11-11 PROCEDURE — 80053 COMPREHEN METABOLIC PANEL: CPT

## 2018-11-11 PROCEDURE — 99284 EMERGENCY DEPT VISIT MOD MDM: CPT

## 2018-11-11 PROCEDURE — 96374 THER/PROPH/DIAG INJ IV PUSH: CPT

## 2018-11-11 PROCEDURE — 6360000002 HC RX W HCPCS: Performed by: PHYSICIAN ASSISTANT

## 2018-11-11 PROCEDURE — 87086 URINE CULTURE/COLONY COUNT: CPT

## 2018-11-11 PROCEDURE — 2580000003 HC RX 258: Performed by: PHYSICIAN ASSISTANT

## 2018-11-11 PROCEDURE — 84703 CHORIONIC GONADOTROPIN ASSAY: CPT

## 2018-11-11 RX ORDER — MORPHINE SULFATE 2 MG/ML
4 INJECTION, SOLUTION INTRAMUSCULAR; INTRAVENOUS ONCE
Status: COMPLETED | OUTPATIENT
Start: 2018-11-11 | End: 2018-11-12

## 2018-11-11 RX ORDER — 0.9 % SODIUM CHLORIDE 0.9 %
1000 INTRAVENOUS SOLUTION INTRAVENOUS ONCE
Status: COMPLETED | OUTPATIENT
Start: 2018-11-11 | End: 2018-11-12

## 2018-11-11 RX ORDER — ONDANSETRON 2 MG/ML
4 INJECTION INTRAMUSCULAR; INTRAVENOUS ONCE
Status: COMPLETED | OUTPATIENT
Start: 2018-11-11 | End: 2018-11-11

## 2018-11-11 RX ORDER — KETOROLAC TROMETHAMINE 30 MG/ML
30 INJECTION, SOLUTION INTRAMUSCULAR; INTRAVENOUS ONCE
Status: COMPLETED | OUTPATIENT
Start: 2018-11-11 | End: 2018-11-11

## 2018-11-11 RX ADMIN — SODIUM CHLORIDE 1000 ML: 9 INJECTION, SOLUTION INTRAVENOUS at 23:10

## 2018-11-11 RX ADMIN — KETOROLAC TROMETHAMINE 30 MG: 30 INJECTION, SOLUTION INTRAMUSCULAR at 23:10

## 2018-11-11 RX ADMIN — ONDANSETRON 4 MG: 2 INJECTION, SOLUTION INTRAMUSCULAR; INTRAVENOUS at 23:10

## 2018-11-11 ASSESSMENT — PAIN DESCRIPTION - DESCRIPTORS: DESCRIPTORS: SQUEEZING;SHARP

## 2018-11-11 ASSESSMENT — PAIN SCALES - GENERAL
PAINLEVEL_OUTOF10: 9
PAINLEVEL_OUTOF10: 9

## 2018-11-11 ASSESSMENT — PAIN DESCRIPTION - PAIN TYPE: TYPE: ACUTE PAIN

## 2018-11-11 ASSESSMENT — PAIN DESCRIPTION - ORIENTATION: ORIENTATION: RIGHT;LOWER

## 2018-11-11 ASSESSMENT — PAIN DESCRIPTION - LOCATION: LOCATION: ABDOMEN

## 2018-11-12 LAB
BILIRUBIN URINE: NEGATIVE
BLOOD, URINE: NEGATIVE
CLARITY: CLEAR
COLOR: YELLOW
EPITHELIAL CELLS, UA: 5 /HPF (ref 0–5)
GLUCOSE URINE: NEGATIVE MG/DL
HYALINE CASTS: 3 /LPF (ref 0–8)
KETONES, URINE: NEGATIVE MG/DL
LACTIC ACID: 0.9 MMOL/L (ref 0.4–2)
LEUKOCYTE ESTERASE, URINE: ABNORMAL
MICROSCOPIC EXAMINATION: YES
NITRITE, URINE: NEGATIVE
PH UA: 7.5
PROTEIN UA: ABNORMAL MG/DL
RBC UA: 2 /HPF (ref 0–4)
SPECIFIC GRAVITY UA: 1.03
URINE REFLEX TO CULTURE: YES
URINE TYPE: ABNORMAL
UROBILINOGEN, URINE: 1 E.U./DL
WBC UA: 11 /HPF (ref 0–5)

## 2018-11-12 PROCEDURE — 96375 TX/PRO/DX INJ NEW DRUG ADDON: CPT

## 2018-11-12 PROCEDURE — 96361 HYDRATE IV INFUSION ADD-ON: CPT

## 2018-11-12 PROCEDURE — 6360000004 HC RX CONTRAST MEDICATION: Performed by: EMERGENCY MEDICINE

## 2018-11-12 PROCEDURE — 83605 ASSAY OF LACTIC ACID: CPT

## 2018-11-12 PROCEDURE — 6360000002 HC RX W HCPCS: Performed by: EMERGENCY MEDICINE

## 2018-11-12 PROCEDURE — 81001 URINALYSIS AUTO W/SCOPE: CPT

## 2018-11-12 RX ORDER — TRAMADOL HYDROCHLORIDE 50 MG/1
50 TABLET ORAL EVERY 6 HOURS PRN
Qty: 10 TABLET | Refills: 0 | Status: SHIPPED | OUTPATIENT
Start: 2018-11-12 | End: 2018-11-20 | Stop reason: ALTCHOICE

## 2018-11-12 RX ADMIN — MORPHINE SULFATE 4 MG: 2 INJECTION, SOLUTION INTRAMUSCULAR; INTRAVENOUS at 00:01

## 2018-11-12 RX ADMIN — IOPAMIDOL 75 ML: 755 INJECTION, SOLUTION INTRAVENOUS at 00:21

## 2018-11-12 ASSESSMENT — PAIN SCALES - GENERAL
PAINLEVEL_OUTOF10: 8
PAINLEVEL_OUTOF10: 0

## 2018-11-12 NOTE — ED NOTES
Bed: B-33  Expected date:   Expected time:   Means of arrival:   Comments:  [address] [facility],[address], [address]  11/11/18 2228

## 2018-11-12 NOTE — ED PROVIDER NOTES
that she does not use drugs. Family History: family history includes Heart Disease in her father. Allergies: Red dye    Physical Exam            ED Triage Vitals [18 2226]   BP Temp Temp Source Pulse Resp SpO2 Height Weight   (!) 111/56 97.8 °F (36.6 °C) Oral 90 22 100 % 5' 4\" (1.626 m) 185 lb 6.5 oz (84.1 kg)      Oxygen Saturation Interpretation: Normal.    · General Appearance/Constitutional:  Alert, development consistent with age  · HEENT:  NC/NT. PERRLA. Airway patent. · Neck:  Supple. No lymphadenopathy. · Respiratory:  No retractions. Lungs Clear to auscultation and breath sounds equal.  · CV:  Regular rate and rhythm. · GI:  General Appearance: low midline, horizontal  incision. Lateral right side of incision with hard palpable area, +tender, no fluctuance, no drainage. Appears to be healing well. Bowel sounds: normal bowel sounds. Distension:  None. Tenderness: No abdominal tenderness. Liver: non-palpable and non-tender. Spleen:  non-palpable and non-tender. Pulsatile Mass: absent. Hernia:  no inguinal or femoral hernias noted. · Back: CVA Tenderness: No.  · Integument:  Normal turgor. Warm, dry, without visible rash, unless noted elsewhere. · Lymphatics: No edema, cap.refill <3sec. · Neurological:  Orientation age-appropriate. Motor functions intact.     Lab / Imaging Results   (All laboratory and radiology results have been personally reviewed by myself)  Labs:  Results for orders placed or performed during the hospital encounter of 18   CBC   Result Value Ref Range    WBC 8.7 4.0 - 11.0 K/uL    RBC 3.70 (L) 4.00 - 5.20 M/uL    Hemoglobin 9.6 (L) 12.0 - 16.0 g/dL    Hematocrit 29.9 (L) 36.0 - 48.0 %    MCV 80.9 80.0 - 100.0 fL    MCH 25.9 (L) 26.0 - 34.0 pg    MCHC 32.0 31.0 - 36.0 g/dL    RDW 18.2 (H) 12.4 - 15.4 %    Platelets 660 282 - 901 K/uL    MPV 7.9 5.0 - 10.5 fL   Comprehensive Making     Medications   ketorolac (TORADOL) injection 30 mg (30 mg Intravenous Given 18)   0.9 % sodium chloride bolus (0 mLs Intravenous Stopped 18 0114)   ondansetron (ZOFRAN) injection 4 mg (4 mg Intravenous Given 18)   iopamidol (ISOVUE-370) 76 % injection 75 mL (75 mLs Intravenous Given 18 0021)   morphine (PF) injection 4 mg (4 mg Intravenous Given 18 0001)        Re-Evaluations:  18      Time: 2335   Patients symptoms show no change. MDM: Afebrile, stable, patient presents to the ED for evaluation. Seen in conjunction with attending ED provider who agrees with assessment and plan. Patients PO2 is 100% on room air, they are not hypoxic, non toxic appearance, +tenderness to palpation of abdomen surrounding  scar. Labs evaluated revealUrinalysis reveals a small amount of proteinuria and leukocytes however and to fill this is contaminant patient is not symptomatic. Labs reveal hemoglobin of 9.6 hematocrit of 29.9. Likely secondary to post partum bleeding patient is advised to continue taking her prenatal vitamins and have her iron level rechecked. Normal kidney and liver function no electrolyte abnormalities. CT of the abdomen and pelvis is ordered to rule out a fragmented abscess dehiscence or complication from surgery CT returns unremarkable no evidence of superficial or intraperitoneal postoperative abscess no acute findings in the abdomen or pelvis. Pt signed out in stable condition to attending ED provider, at the end of my shift. All questions are answered. Indications for return to the ED are discussed. Patient is advised if any new or worsening symptoms arise they should immediately return to the emergency room. Follow-up with primary care in 1-2 days.     I estimate there is LOW risk for ACUTE APPENDICITIS, BOWEL OBSTRUCTION, CHOLECYSTITIS, DIVERTICULITIS, INCARCERATED HERNIA, PANCREATITIS, or PERFORATED BOWEL or ULCER, thus I

## 2018-11-13 LAB — URINE CULTURE, ROUTINE: NORMAL

## 2018-11-20 ENCOUNTER — HOSPITAL ENCOUNTER (OUTPATIENT)
Dept: OBGYN CLINIC | Age: 23
Discharge: HOME | End: 2018-11-20
Payer: MEDICARE

## 2018-11-20 VITALS
DIASTOLIC BLOOD PRESSURE: 72 MMHG | BODY MASS INDEX: 31.24 KG/M2 | SYSTOLIC BLOOD PRESSURE: 108 MMHG | WEIGHT: 182 LBS | HEART RATE: 99 BPM

## 2018-11-20 PROCEDURE — 99211 OFF/OP EST MAY X REQ PHY/QHP: CPT

## 2018-11-20 NOTE — CARE COORDINATION
LSW met with patient for postpartum visit. Patient states that she is doing well and denies any community resources. FOB states that a friend who is currently staying with them is caring for children while they are at appointment. LSW gave EPDS patient scored 0/30.   Electronically signed by [place] on 11/20/2018 at 4:31 PM

## 2018-11-20 NOTE — PROGRESS NOTES
[address]:  8                Apgar5: 9   7 Para 05/22/14   7 lb 0.2 oz (3.181 kg) M CS-Unspec   BROCK      Name: Cyndie Powers:  Ashish                Apgar5: 9   6 Para 12/13/12    M CS-LTranv Spinal  BROCK   5 Para 09/19/09    M CS-LTranv   BROCK   4 TAB            3 TAB            2 Term         BROCK   1 Term        N BROCK          ALLERGIES    Allergies   Allergen Reactions    Red Dye        MEDICATIONS    Current Outpatient Prescriptions on File Prior to Encounter   Medication Sig Dispense Refill    acetaminophen (APAP EXTRA STRENGTH) 500 MG tablet Take 1 tablet by mouth every 6 hours as needed for Pain 30 tablet 0    docusate (COLACE, DULCOLAX) 100 MG CAPS Take 100 mg by mouth 2 times daily 14 capsule 0     No current facility-administered medications on file prior to encounter. REVIEW OF SYSTEMS    Pertinent items are noted in HPI. Objective: There were no vitals taken for this visit. Wt Readings from Last 2 Encounters:   11/11/18 185 lb 6.5 oz (84.1 kg)   08/17/17 191 lb 9.3 oz (86.9 kg)       Appearance/Psychiatric: alert and oriented X3  Constitutional: Appears well, no distress  Cardiovascular: She does not have edema. Respiratory effort: Respirations  are not easy with no stridor. Gastrointestinal: Abdomen  is soft and  is non tender ; TENDER 5MM NODULE R SQ INCISION  Breasts: declinded by patient. Genitourinary:  External Genitalia:  normal genitalia, no erythema, no discharge  Vagina:  normal vagina. No discharge, exudate, lesions, erythema  Bladder: non tender to palpitation. Urethra:  Normal appearing urethra with no masses, tenderness or lesions  Uterus:  normal size, non-tender   Adnexa: Normal, no palpable masses     Pelvic Exam:  A pelvic exam was completed during this visit. Verbal consent obtained for pelvic exam:  yes  Response to pelvic exam:  Well tolerated by patient.     Last PAP smear:  UNKNOWN  Date of last Pap smear:

## 2018-11-27 ENCOUNTER — TELEPHONE (OUTPATIENT)
Dept: OBGYN CLINIC | Age: 23
End: 2018-11-27

## 2021-08-06 ENCOUNTER — APPOINTMENT (OUTPATIENT)
Dept: GENERAL RADIOLOGY | Age: 26
DRG: 754 | End: 2021-08-06
Payer: MEDICARE

## 2021-08-06 ENCOUNTER — HOSPITAL ENCOUNTER (INPATIENT)
Age: 26
LOS: 3 days | Discharge: HOME | DRG: 754 | End: 2021-08-09
Attending: STUDENT IN AN ORGANIZED HEALTH CARE EDUCATION/TRAINING PROGRAM | Admitting: PSYCHIATRY & NEUROLOGY
Payer: MEDICARE

## 2021-08-06 DIAGNOSIS — F39 MOOD DISORDER (HCC): Primary | ICD-10-CM

## 2021-08-06 DIAGNOSIS — R45.851 SUICIDAL THOUGHTS: ICD-10-CM

## 2021-08-06 PROBLEM — F33.9 MAJOR DEPRESSIVE DISORDER, RECURRENT (HCC): Status: ACTIVE | Noted: 2021-08-06

## 2021-08-06 LAB
A/G RATIO: 1.5 (ref 1.1–2.2)
ACETAMINOPHEN LEVEL: <5 UG/ML (ref 10–30)
ALBUMIN SERPL-MCNC: 4.2 G/DL (ref 3.4–5)
ALP BLD-CCNC: 65 U/L (ref 40–129)
ALT SERPL-CCNC: 29 U/L (ref 10–40)
AMPHETAMINE SCREEN, URINE: ABNORMAL
ANION GAP SERPL CALCULATED.3IONS-SCNC: 9 MMOL/L (ref 3–16)
AST SERPL-CCNC: 22 U/L (ref 15–37)
BACTERIA: ABNORMAL /HPF
BARBITURATE SCREEN URINE: ABNORMAL
BASOPHILS ABSOLUTE: 0 K/UL (ref 0–0.2)
BASOPHILS RELATIVE PERCENT: 0.4 %
BENZODIAZEPINE SCREEN, URINE: ABNORMAL
BILIRUB SERPL-MCNC: 0.4 MG/DL (ref 0–1)
BILIRUBIN URINE: NEGATIVE
BLOOD, URINE: ABNORMAL
BUN BLDV-MCNC: 9 MG/DL (ref 7–20)
CALCIUM SERPL-MCNC: 8.8 MG/DL (ref 8.3–10.6)
CANNABINOID SCREEN URINE: POSITIVE
CHLORIDE BLD-SCNC: 106 MMOL/L (ref 99–110)
CLARITY: CLEAR
CO2: 23 MMOL/L (ref 21–32)
COCAINE METABOLITE SCREEN URINE: ABNORMAL
COLOR: YELLOW
CREAT SERPL-MCNC: 0.6 MG/DL (ref 0.6–1.1)
EOSINOPHILS ABSOLUTE: 0.1 K/UL (ref 0–0.6)
EOSINOPHILS RELATIVE PERCENT: 1.5 %
EPITHELIAL CELLS, UA: ABNORMAL /HPF (ref 0–5)
ETHANOL: NORMAL MG/DL (ref 0–0.08)
GFR AFRICAN AMERICAN: >60
GFR NON-AFRICAN AMERICAN: >60
GLOBULIN: 2.8 G/DL
GLUCOSE BLD-MCNC: 95 MG/DL (ref 70–99)
GLUCOSE URINE: NEGATIVE MG/DL
HCG QUALITATIVE: NEGATIVE
HCT VFR BLD CALC: 41 % (ref 36–48)
HEMOGLOBIN: 14.1 G/DL (ref 12–16)
INFLUENZA A: NOT DETECTED
INFLUENZA B: NOT DETECTED
KETONES, URINE: NEGATIVE MG/DL
LEUKOCYTE ESTERASE, URINE: NEGATIVE
LYMPHOCYTES ABSOLUTE: 2.7 K/UL (ref 1–5.1)
LYMPHOCYTES RELATIVE PERCENT: 29.4 %
Lab: ABNORMAL
MCH RBC QN AUTO: 33.8 PG (ref 26–34)
MCHC RBC AUTO-ENTMCNC: 34.3 G/DL (ref 31–36)
MCV RBC AUTO: 98.7 FL (ref 80–100)
METHADONE SCREEN, URINE: ABNORMAL
MICROSCOPIC EXAMINATION: YES
MONOCYTES ABSOLUTE: 0.5 K/UL (ref 0–1.3)
MONOCYTES RELATIVE PERCENT: 5.5 %
MUCUS: ABNORMAL /LPF
NEUTROPHILS ABSOLUTE: 5.7 K/UL (ref 1.7–7.7)
NEUTROPHILS RELATIVE PERCENT: 63.2 %
NITRITE, URINE: NEGATIVE
OPIATE SCREEN URINE: ABNORMAL
OXYCODONE URINE: ABNORMAL
PDW BLD-RTO: 13.2 % (ref 12.4–15.4)
PH UA: 8
PH UA: 8 (ref 5–8)
PHENCYCLIDINE SCREEN URINE: ABNORMAL
PLATELET # BLD: 180 K/UL (ref 135–450)
PMV BLD AUTO: 8.8 FL (ref 5–10.5)
POTASSIUM REFLEX MAGNESIUM: 4 MMOL/L (ref 3.5–5.1)
PROPOXYPHENE SCREEN: ABNORMAL
PROTEIN UA: NEGATIVE MG/DL
RBC # BLD: 4.16 M/UL (ref 4–5.2)
RBC UA: ABNORMAL /HPF (ref 0–4)
SALICYLATE, SERUM: <0.3 MG/DL (ref 15–30)
SARS-COV-2 RNA, RT PCR: NOT DETECTED
SODIUM BLD-SCNC: 138 MMOL/L (ref 136–145)
SPECIFIC GRAVITY UA: 1.02 (ref 1–1.03)
TOTAL PROTEIN: 7 G/DL (ref 6.4–8.2)
URINE REFLEX TO CULTURE: ABNORMAL
URINE TYPE: ABNORMAL
UROBILINOGEN, URINE: 4 E.U./DL
WBC # BLD: 9 K/UL (ref 4–11)
WBC UA: ABNORMAL /HPF (ref 0–5)

## 2021-08-06 PROCEDURE — 80053 COMPREHEN METABOLIC PANEL: CPT

## 2021-08-06 PROCEDURE — 87636 SARSCOV2 & INF A&B AMP PRB: CPT

## 2021-08-06 PROCEDURE — 36415 COLL VENOUS BLD VENIPUNCTURE: CPT

## 2021-08-06 PROCEDURE — 99285 EMERGENCY DEPT VISIT HI MDM: CPT

## 2021-08-06 PROCEDURE — 6370000000 HC RX 637 (ALT 250 FOR IP): Performed by: PHYSICIAN ASSISTANT

## 2021-08-06 PROCEDURE — 84703 CHORIONIC GONADOTROPIN ASSAY: CPT

## 2021-08-06 PROCEDURE — 71045 X-RAY EXAM CHEST 1 VIEW: CPT

## 2021-08-06 PROCEDURE — 80061 LIPID PANEL: CPT

## 2021-08-06 PROCEDURE — 82077 ASSAY SPEC XCP UR&BREATH IA: CPT

## 2021-08-06 PROCEDURE — 80143 DRUG ASSAY ACETAMINOPHEN: CPT

## 2021-08-06 PROCEDURE — 80307 DRUG TEST PRSMV CHEM ANLYZR: CPT

## 2021-08-06 PROCEDURE — 83036 HEMOGLOBIN GLYCOSYLATED A1C: CPT

## 2021-08-06 PROCEDURE — 84443 ASSAY THYROID STIM HORMONE: CPT

## 2021-08-06 PROCEDURE — 1240000000 HC EMOTIONAL WELLNESS R&B

## 2021-08-06 PROCEDURE — 85025 COMPLETE CBC W/AUTO DIFF WBC: CPT

## 2021-08-06 PROCEDURE — 81001 URINALYSIS AUTO W/SCOPE: CPT

## 2021-08-06 PROCEDURE — 80179 DRUG ASSAY SALICYLATE: CPT

## 2021-08-06 RX ORDER — NICOTINE 21 MG/24HR
1 PATCH, TRANSDERMAL 24 HOURS TRANSDERMAL ONCE
Status: COMPLETED | OUTPATIENT
Start: 2021-08-06 | End: 2021-08-07

## 2021-08-06 RX ORDER — OLANZAPINE 10 MG/1
10 INJECTION, POWDER, LYOPHILIZED, FOR SOLUTION INTRAMUSCULAR
Status: ACTIVE | OUTPATIENT
Start: 2021-08-06 | End: 2021-08-06

## 2021-08-06 RX ORDER — OLANZAPINE 10 MG/1
10 TABLET ORAL
Status: ACTIVE | OUTPATIENT
Start: 2021-08-06 | End: 2021-08-06

## 2021-08-06 RX ORDER — TRAZODONE HYDROCHLORIDE 50 MG/1
50 TABLET ORAL NIGHTLY PRN
Status: DISCONTINUED | OUTPATIENT
Start: 2021-08-06 | End: 2021-08-07

## 2021-08-06 RX ORDER — ACETAMINOPHEN 325 MG/1
650 TABLET ORAL EVERY 4 HOURS PRN
Status: DISCONTINUED | OUTPATIENT
Start: 2021-08-06 | End: 2021-08-08

## 2021-08-06 RX ORDER — LORAZEPAM 1 MG/1
1 TABLET ORAL ONCE
Status: COMPLETED | OUTPATIENT
Start: 2021-08-06 | End: 2021-08-06

## 2021-08-06 RX ORDER — IBUPROFEN 400 MG/1
400 TABLET ORAL EVERY 6 HOURS PRN
Status: DISCONTINUED | OUTPATIENT
Start: 2021-08-06 | End: 2021-08-09 | Stop reason: HOSPADM

## 2021-08-06 RX ORDER — BENZTROPINE MESYLATE 1 MG/ML
2 INJECTION INTRAMUSCULAR; INTRAVENOUS 2 TIMES DAILY PRN
Status: DISCONTINUED | OUTPATIENT
Start: 2021-08-06 | End: 2021-08-09 | Stop reason: HOSPADM

## 2021-08-06 RX ORDER — MAGNESIUM HYDROXIDE/ALUMINUM HYDROXICE/SIMETHICONE 120; 1200; 1200 MG/30ML; MG/30ML; MG/30ML
30 SUSPENSION ORAL EVERY 6 HOURS PRN
Status: DISCONTINUED | OUTPATIENT
Start: 2021-08-06 | End: 2021-08-09 | Stop reason: HOSPADM

## 2021-08-06 RX ORDER — TRAZODONE HYDROCHLORIDE 50 MG/1
50 TABLET ORAL NIGHTLY PRN
Status: DISCONTINUED | OUTPATIENT
Start: 2021-08-07 | End: 2021-08-06

## 2021-08-06 RX ADMIN — LORAZEPAM 1 MG: 1 TABLET ORAL at 18:40

## 2021-08-06 ASSESSMENT — SLEEP AND FATIGUE QUESTIONNAIRES
SLEEP PATTERN: DIFFICULTY FALLING ASLEEP
DO YOU USE A SLEEP AID: NO
DIFFICULTY STAYING ASLEEP: NO
DO YOU HAVE DIFFICULTY SLEEPING: YES
DIFFICULTY FALLING ASLEEP: YES
DIFFICULTY ARISING: NO
RESTFUL SLEEP: NO
AVERAGE NUMBER OF SLEEP HOURS: 8

## 2021-08-06 ASSESSMENT — ENCOUNTER SYMPTOMS
SHORTNESS OF BREATH: 0
ABDOMINAL PAIN: 0
VOMITING: 0
COUGH: 1

## 2021-08-06 ASSESSMENT — LIFESTYLE VARIABLES: HISTORY_ALCOHOL_USE: NO

## 2021-08-06 ASSESSMENT — PAIN SCALES - GENERAL: PAINLEVEL_OUTOF10: 0
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
## 2021-08-06 NOTE — ED PROVIDER NOTES
I independently examined and evaluated Zoila Fontenot. All diagnostic, treatment, and disposition decisions were made by myself in conjunction with the advanced practice provider. For all further details of the patient's emergency department visit, please see the advanced practice provider's documentation. Primary Care Physician: No primary care provider on file. History: This is a 27 y.o. female who presents to the Emergency Department with complaint of here for psychiatric evaluation. Patient voicing suicidal complaint. Patient placed on hold. Medically clear. See [facility] documentation for conversation with psychiatrist.  Recommending admission. Physical:     temperature is 98.7 °F (37.1 °C). Her blood pressure is 151/88 (abnormal) and her pulse is 101. Her respiration is 22 and oxygen saturation is 98%.    27 y.o. female   Tearful  Heart slightly tachycardic, likely due to anxiety, regular  Lungs clear    Impression: Suicidal ideation    Plan: Psychiatry consult, admit        CRITICAL CARE: There was a high probability of clinically significant/life threatening deterioration in this patient's condition which required my urgent intervention. Total critical care time was 0 minutes. This excludes any time for separately reportable procedures. Josias Morris DO  Emergency Physician        Comment: Please note this report has been produced using speech recognition software and may contain errors related to that system including errors in grammar, punctuation, and spelling, as well as words and phrases that may be inappropriate. If there are any questions or concerns please feel free to contact the dictating provider for clarification.           Josias Morris DO  08/06/21 0564

## 2021-08-07 PROBLEM — F43.10 PTSD (POST-TRAUMATIC STRESS DISORDER): Status: ACTIVE | Noted: 2021-08-07

## 2021-08-07 LAB
CHOLESTEROL, TOTAL: 171 MG/DL (ref 0–199)
HDLC SERPL-MCNC: 48 MG/DL (ref 40–60)
LDL CHOLESTEROL CALCULATED: 89 MG/DL
TRIGL SERPL-MCNC: 172 MG/DL (ref 0–150)
TSH SERPL DL<=0.05 MIU/L-ACNC: 1.72 UIU/ML (ref 0.27–4.2)
VLDLC SERPL CALC-MCNC: 34 MG/DL

## 2021-08-07 PROCEDURE — 99223 1ST HOSP IP/OBS HIGH 75: CPT | Performed by: PSYCHIATRY & NEUROLOGY

## 2021-08-07 PROCEDURE — 6370000000 HC RX 637 (ALT 250 FOR IP): Performed by: PSYCHIATRY & NEUROLOGY

## 2021-08-07 PROCEDURE — 99222 1ST HOSP IP/OBS MODERATE 55: CPT | Performed by: PHYSICIAN ASSISTANT

## 2021-08-07 PROCEDURE — 1240000000 HC EMOTIONAL WELLNESS R&B

## 2021-08-07 RX ORDER — TRAZODONE HYDROCHLORIDE 50 MG/1
50 TABLET ORAL NIGHTLY
Status: DISCONTINUED | OUTPATIENT
Start: 2021-08-07 | End: 2021-08-09 | Stop reason: HOSPADM

## 2021-08-07 RX ORDER — NICOTINE 21 MG/24HR
1 PATCH, TRANSDERMAL 24 HOURS TRANSDERMAL DAILY
Status: DISCONTINUED | OUTPATIENT
Start: 2021-08-07 | End: 2021-08-07

## 2021-08-07 RX ORDER — HYDROXYZINE PAMOATE 50 MG/1
50 CAPSULE ORAL EVERY 6 HOURS PRN
Status: DISCONTINUED | OUTPATIENT
Start: 2021-08-07 | End: 2021-08-09 | Stop reason: HOSPADM

## 2021-08-07 RX ORDER — ONDANSETRON 4 MG/1
4 TABLET, ORALLY DISINTEGRATING ORAL EVERY 8 HOURS PRN
Status: DISCONTINUED | OUTPATIENT
Start: 2021-08-07 | End: 2021-08-09 | Stop reason: HOSPADM

## 2021-08-07 RX ORDER — LORAZEPAM 1 MG/1
1 TABLET ORAL EVERY 8 HOURS PRN
Status: DISCONTINUED | OUTPATIENT
Start: 2021-08-07 | End: 2021-08-09 | Stop reason: HOSPADM

## 2021-08-07 RX ORDER — NICOTINE 21 MG/24HR
1 PATCH, TRANSDERMAL 24 HOURS TRANSDERMAL DAILY
Status: DISCONTINUED | OUTPATIENT
Start: 2021-08-07 | End: 2021-08-09 | Stop reason: HOSPADM

## 2021-08-07 RX ORDER — HYDROXYZINE PAMOATE 25 MG/1
25 CAPSULE ORAL EVERY 6 HOURS PRN
Status: DISCONTINUED | OUTPATIENT
Start: 2021-08-07 | End: 2021-08-07

## 2021-08-07 RX ADMIN — HYDROXYZINE PAMOATE 25 MG: 25 CAPSULE ORAL at 10:53

## 2021-08-07 RX ADMIN — TRAZODONE HYDROCHLORIDE 50 MG: 50 TABLET ORAL at 21:35

## 2021-08-07 RX ADMIN — SERTRALINE HYDROCHLORIDE 50 MG: 50 TABLET ORAL at 10:53

## 2021-08-07 RX ADMIN — HYDROXYZINE PAMOATE 50 MG: 50 CAPSULE ORAL at 17:02

## 2021-08-07 ASSESSMENT — LIFESTYLE VARIABLES: HISTORY_ALCOHOL_USE: YES

## 2021-08-07 ASSESSMENT — SLEEP AND FATIGUE QUESTIONNAIRES
DO YOU USE A SLEEP AID: YES
DO YOU HAVE DIFFICULTY SLEEPING: YES
SLEEP PATTERN: NIGHTMARES/TERRORS
RESTFUL SLEEP: YES
DIFFICULTY ARISING: NO
AVERAGE NUMBER OF SLEEP HOURS: 8
DIFFICULTY FALLING ASLEEP: NO
DIFFICULTY STAYING ASLEEP: YES

## 2021-08-07 ASSESSMENT — PATIENT HEALTH QUESTIONNAIRE - PHQ9: SUM OF ALL RESPONSES TO PHQ QUESTIONS 1-9: 15

## 2021-08-07 NOTE — PROGRESS NOTES
Patient arrived on the unit in wheel- chair accompanied by staff RN and . Patient alert and oriented x 3 with steady gait. Oriented to unit.

## 2021-08-07 NOTE — PROGRESS NOTES
Behavioral Services  Medicare Certification Upon Admission    I certify that this patient's inpatient psychiatric hospital admission is medically necessary for:    [x] (1) Treatment which could reasonably be expected to improve this patient's condition,       [x] (2) Or for diagnostic study;     AND     [x](2) The inpatient psychiatric services are provided while the individual is under the care of a physician and are included in the individualized plan of care.     Estimated length of stay/service 2-3 d    Plan for post-hospital care outpt    Electronically signed by Virginia Lei MD on 8/7/2021 at 10:12 AM (0) No abnormality

## 2021-08-07 NOTE — BH NOTE
Patient requested and given Vistaril for anxiety.    -Per patient - medication helped some with the anxiety

## 2021-08-07 NOTE — H&P
Hospital Medicine History & Physical      PCP: No primary care provider on file. Date of Admission: 2021    Date of Service: Pt seen/examined on 2021    Chief Complaint:    Chief Complaint   Patient presents with    Psychiatric Evaluation         History Of Present Illness: The patient is a 27 y.o. female with a PMH of Depression who presented to [facility] for suicidal ideation. Patient was seen and evaluated in the ED by the ED medical provider, patient was medically cleared for admission to [facility] at [facility]. This note serves as an admission medical H&P.   PCP: No primary care provider on file. Tobacco Use: 1 ppd  EtOH Use: 2 beers nightly  Illicit Drug Use: occasional cannabis use, UDS + cannabis    Pt reports that she has been seeing shadows for the past 3-4 months. Primarily in peripheral vision. No headaches, blurry vision or double vision. No eye pain. Has not seen any ophthalmologist for this. Past Medical History:        Diagnosis Date    Anemia     Depression     Gastric ulcer, unspecified as acute or chronic, without mention of hemorrhage, perforation, or obstruction     Rh incompatibility     Thyroid disease        Past Surgical History:        Procedure Laterality Date     SECTION      [place]  DELIVERY ONLY N/A 10/6/2018     SECTION performed by [facility]cala [facility] at [facility] L[facility]D OR    TONSILLECTOMY      TYMPANOSTOMY TUBE PLACEMENT         Medications Prior to Admission:    Prior to Admission medications    Not on File       Allergies:  Red dye    Social History:  The patient currently lives with family. TOBACCO:   reports that she has been smoking. She has been smoking about 1.00 pack per day. She has never used smokeless tobacco.  ETOH:   reports current alcohol use of about 2.0 standard drinks of alcohol per week.       Family History:   Positive as follows:        Problem Relation Age of Onset    Heart Disease Father        REVIEW OF SYSTEMS: Constitutional: Negative for fever   HENT: Negative for sore throat   Eyes: Negative for redness, + vision changes as described above   Respiratory: Negative  for dyspnea, cough   Cardiovascular: Negative for chest pain   Gastrointestinal: Negative for vomiting, diarrhea   Genitourinary: Negative for hematuria   Musculoskeletal: Negative for arthralgias   Skin: Negative for rash   Neurological: Negative for syncope   Hematological: Negative for adenopathy   Psychiatric/Behavorial: Negative for anxiety    PHYSICAL EXAM:    /67   Pulse 82   Temp 97.7 °F (36.5 °C) (Temporal)   Resp 14   Ht 5' 4\" (1.626 m)   Wt 190 lb (86.2 kg)   LMP 07/28/2021 (Approximate)   SpO2 96%   Breastfeeding No   BMI 32.61 kg/m²   Gen: No distress. Alert. [facility]  female   Eyes: PERRL. No sclera icterus. No conjunctival injection. ENT: No discharge. Pharynx clear. Neck:  Trachea midline. Resp: No accessory muscle use. No crackles. No wheezes. No rhonchi. CV: Regular rate. Regular rhythm. No murmur. No rub. No edema. GI: Soft, Non-tender. Non-distended. Skin: Warm and dry. No rash on exposed extremities. Neuro: Awake. Grossly nonfocal, CN II-XII intact, moves all extremities, follows commands, no dysarthria    Psych: Defer to psychiatry.     CBC:   Recent Labs     08/06/21  1556   WBC 9.0   HGB 14.1   HCT 41.0   MCV 98.7        BMP:   Recent Labs     08/06/21  1556      K 4.0      CO2 23   BUN 9   CREATININE 0.6     LIVER PROFILE:   Recent Labs     08/06/21  1556   AST 22   ALT 29   BILITOT 0.4   ALKPHOS 65     UA:  Recent Labs     08/06/21  1556   COLORU Yellow   PHUR 8.0  8.0   WBCUA None seen   RBCUA 0-2   MUCUS Rare*   BACTERIA 1+*   CLARITYU Clear   SPECGRAV 1.020   LEUKOCYTESUR Negative   UROBILINOGEN 4.0*   BILIRUBINUR Negative   BLOODU SMALL*   GLUCOSEU Negative      U/A:    Lab Results   Component Value Date    NITRITE positve 06/02/2011    COLORU Yellow 08/06/2021    WBCUA None seen 08/06/2021    RBCUA 0-2 08/06/2021    MUCUS Rare 08/06/2021    BACTERIA 1+ 08/06/2021    CLARITYU Clear 08/06/2021    SPECGRAV 1.020 08/06/2021    LEUKOCYTESUR Negative 08/06/2021    BLOODU SMALL 08/06/2021    GLUCOSEU Negative 08/06/2021    GLUCOSEU NEGATIVE 06/02/2011     CULTURES    SARS-COV-2 - Rapid: Not detected     Rapid Influenza A/B: negative      RADIOLOGY    XR CHEST APICAL LORDOTIC ONLY   Final Result   No acute cardiopulmonary disease. The aforementioned nodular density as seen   on the prior chest x-ray does not persist on today's apical lordotic view,   and likely reflected artifact in relation to the left anterior 2nd rib. XR CHEST PORTABLE   Final Result   1. No acute airspace consolidation. 2. Apparent 15 mm nodular opacity overlying the left upper lung zone in the   region of the left anterior 2nd rib. This most likely reflects callus   formation from a prior rib fracture, though a pulmonary nodule is also a less   likely consideration. Recommend an apical lordotic view or a chest CT for   further characterization. 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    Vision Changes  - reports seeing shadows in her peripheral vision which has been ongoing for 3-4 months  - has not followed with ophthalmology, denies any headaches, blurry vision, double vision or eye pain  - pt informed that we do not have ophthalmology services available in the hospital. Referral placed in pt's discharged instructions and she has been instructed to f/u after discharge OP    Small Blood Noted on UA  - pt denies any gross hematuria  - recently had her period  - f/u OP for repeat UA    Alcohol Abuse  - reports drinking 2 beers daily  - no hx of alcohol w/d  - recommended pt cut back on alcohol consumption    Cannabis Abuse  - UDS +  - counseled cessation     Tobacco Dependence  - Recommended cessation    Obesity  - Body mass index is 32.61 kg/m².   - Counseled on weight loss. Pt has no medical complaints at this time. Pt was informed that they may have [facility] contact us should any medical concerns arise during this admission.     [place], [place] HERBER 10:51 AM 8/7/2021

## 2021-08-07 NOTE — PROGRESS NOTES
techniques, changing routine, distraction)                                                           ( )  Basic information about quitting (benefits of quitting, techniques in how to quit, available resources  ( ) Referral for counseling faxed to [facility]                                           ( x) Patient refused counseling  ( ) Patient has not smoked in the last 30 days    Metabolic Screening:    No results found for: LABA1C    No results found for: CHOL  No results found for: TRIG  No results found for: HDL  No components found for: LDLCAL  No results found for: LABVLDL      Body mass index is 32.61 kg/m². BP Readings from Last 2 Encounters:   08/06/21 135/86   11/20/18 108/72           Pt admitted with followings belongings:  Dentures: None  Vision - Corrective Lenses: None  Hearing Aid: None  Jewelry: None  Body Piercings Removed: N/A  Clothing: Pants, Shirt, Undergarments (Comment), Footwear (black book bag in locker)  Were All Patient Medications Collected?: Not Applicable  Other Valuables: None     Patient's home medications were na. Patient oriented to surroundings and program expectations and copy of patient rights given. Received admission packet:  yes. Consents reviewed, signed yes. Refused no. Patient verbalize understanding:  yes.   Patient education on precautions: yes                   Naomi Garza RN

## 2021-08-07 NOTE — BH NOTE
Purposeful Rounding    Patient Location: Patient room    Patient willing to engage in conversation: Yes    Presentation/behavior: Withdrawn    Affect: Sad    Concerns reported: None    PRN medications given: N/A    Environmental assessment: No safety hazards noted    Fall prevention interventions in place: Patient not currently a fall risk    Daily Asya Fall Risk Score: 83    Daily Wolff Fall Risk Score:  Low

## 2021-08-07 NOTE — FLOWSHEET NOTE
Purposeful Rounding    Patient Location: Patient room    Patient willing to engage in conversation: No    Presentation/behavior: Other sleeping*    Affect: Neutral/Euthymic(normal)    Concerns reported: none    PRN medications given: none    Environmental assessment: No safety hazards noted    Fall prevention interventions in place: na    Daily Asya Fall Risk Score: 59    Daily Wolff Fall Risk Score: low

## 2021-08-07 NOTE — PRE-CERTIFICATION NOTE
SOB and face sheet faxed to [place] to complete pre-certification process. All faxed forms and fax transmission sheet e-mailed to [facility].
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
## 2021-08-07 NOTE — BH NOTE
Patient requested and given Vistril for anxiety - rating it an 8/10. Patient states, \"I'm upset because my mom didn't come visit me. \"

## 2021-08-07 NOTE — BH NOTE
Purposeful Rounding     Patient Location: Day room     Patient willing to engage in conversation: Yes     Presentation/behavior: Withdrawn     Affect: Sad     Concerns reported: None     PRN medications given: N/A     Environmental assessment: No safety hazards noted     Fall prevention interventions in place: Patient not currently a fall risk     Daily Asya Fall Risk Score: 83     Daily Wolff Fall Risk Score: Low

## 2021-08-07 NOTE — BH NOTE
Patient came up to nurse's station, and said she threw up some after she ate her meal. The patient already flushed the toilet. Patient also stated she had used the restroom three times, and it was liquidy stool. Dr. Leonardo Riedel aware. See new order. Patient doesn't want the Zofran at this time.

## 2021-08-07 NOTE — BH NOTE
Emesis visible in patient's toilet after supper. Patient stated, \"This happens when I'm stressed out. \" Patient tearful. Patient doesn't want Zofran. Patient not wanting to talk about it at this time. Dr. Franklin Bhakta notified. Will continue to monitor.     -Patient currently sleeping in room.
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
## 2021-08-07 NOTE — PLAN OF CARE
Problem: Altered Mood, Depressive Behavior:  Goal: Able to verbalize acceptance of life and situations over which he or she has no control  Description: Able to verbalize acceptance of life and situations over which he or she has no control  Outcome: Ongoing  Goal: Able to verbalize and/or display a decrease in depressive symptoms  Description: Able to verbalize and/or display a decrease in depressive symptoms  Outcome: Ongoing  Goal: Ability to disclose and discuss suicidal ideas will improve  Description: Ability to disclose and discuss suicidal ideas will improve  Outcome: Ongoing  Goal: Absence of self-harm  Description: Absence of self-harm  Outcome: Ongoing     Patient is interactive with staff. Patient reports SI - \"The thoughts come and go. My plan is to jump off a bridge. I feel like I can be safe in the hospital.\" Patient denies HI/AVH. Patient states they slept poor last night. Patient states \"I kept waking up last night. \" Patient verbalizes they will notify staff if suicidal thoughts worsen or is unable to CFS. She reports feeling \"depressed. \" Patient compliant with medications. Patient is cooperative.

## 2021-08-08 LAB
ESTIMATED AVERAGE GLUCOSE: 96.8 MG/DL
HBA1C MFR BLD: 5 %

## 2021-08-08 PROCEDURE — 6370000000 HC RX 637 (ALT 250 FOR IP): Performed by: PSYCHIATRY & NEUROLOGY

## 2021-08-08 PROCEDURE — 1240000000 HC EMOTIONAL WELLNESS R&B

## 2021-08-08 RX ORDER — ACETAMINOPHEN 325 MG/1
650 TABLET ORAL EVERY 4 HOURS PRN
Status: DISCONTINUED | OUTPATIENT
Start: 2021-08-08 | End: 2021-08-09 | Stop reason: HOSPADM

## 2021-08-08 RX ADMIN — SERTRALINE HYDROCHLORIDE 50 MG: 50 TABLET ORAL at 08:56

## 2021-08-08 RX ADMIN — LORAZEPAM 1 MG: 1 TABLET ORAL at 00:39

## 2021-08-08 RX ADMIN — ACETAMINOPHEN 650 MG: 325 TABLET ORAL at 07:50

## 2021-08-08 RX ADMIN — TRAZODONE HYDROCHLORIDE 50 MG: 50 TABLET ORAL at 21:07

## 2021-08-08 RX ADMIN — HYDROXYZINE PAMOATE 50 MG: 50 CAPSULE ORAL at 14:52

## 2021-08-08 ASSESSMENT — PAIN SCALES - GENERAL
PAINLEVEL_OUTOF10: 0
PAINLEVEL_OUTOF10: 6

## 2021-08-08 NOTE — BH NOTE
Purposeful Rounding     Patient Location: Day room     Patient willing to engage in conversation: Yes     Presentation/behavior: Withdrawn     Affect: Sad     Concerns reported: None     PRN medications given: N/A     Environmental assessment: No safety hazards noted     Fall prevention interventions in place: Patient not currently a fall risk     Daily Asya Fall Risk Score: 81     Daily Wolff Fall Risk Score: Low

## 2021-08-08 NOTE — GROUP NOTE
Group Therapy Note    Date: 8/8/2021    Group Start Time: 1300  Group End Time: 1450  Group Topic: Cognitive Skills    [address] [facility]      Group Therapy Note    Attendees: 9         Patient's Goal:  Explore/learn/discuss coping strategies, including self compassion and daily gratitude. Discussed benefits of practicing coping strategies/daily gratitude. Notes:  Pt actively participated in group discussion. Status After Intervention:  Improved    Participation Level:  Active Listener and Interactive    Participation Quality: Appropriate, Attentive and Sharing      Speech:  normal      Thought Process/Content: Logical      Affective Functioning: Congruent      Mood: anxious      Level of consciousness:  Alert, Oriented x4 and Attentive      Response to Learning: Able to verbalize current knowledge/experience, Able to verbalize/acknowledge new learning, Able to retain information, Capable of insight, Able to change behavior and Progressing to goal      Endings: None Reported    Modes of Intervention: Education, Support, Socialization, Exploration, Clarifying, Problem-solving, Confrontation, Limit-setting and Reality-testing      Discipline Responsible: /Counselor      Signature:  RAMSEY [address]once [address] [place] [id number], [facility]

## 2021-08-08 NOTE — BH NOTE
Purposeful Rounding     Patient Location: Day room     Patient willing to engage in conversation: Yes     Presentation/behavior: Withdrawn     Affect: Sad     Concerns reported: \"I want to be discharged. \"     PRN medications given: N/A     Environmental assessment: No safety hazards noted     Fall prevention interventions in place: Patient not currently a fall risk     Daily Treasure Fall Risk Score: 81     Daily Wolff Fall Risk Score: Low

## 2021-08-08 NOTE — GROUP NOTE
Group Therapy Note    Date: 8/8/2021    Group Start Time: 11:00 AM  Group End Time: 12:00 AM  Group Topic: Recreational    [address] [facility]        Group Therapy Note    Attendees: 13     Patient's Goal: Pt will create mandalas to harness relaxation and creativity. Pt will also be asked to share what they have created       Notes: Pt was supportive and encouraging of other group members. Pt met goal.     Status After Intervention:  Improved    Participation Level:  Active Listener and Interactive    Participation Quality: Appropriate, Attentive, Sharing and Supportive      Speech:  normal      Thought Process/Content: Logical      Affective Functioning: Congruent      Mood: euthymic      Level of consciousness:  Alert, Oriented x4 and Attentive      Response to Learning: Able to verbalize current knowledge/experience, Able to verbalize/acknowledge new learning and Progressing to goal      Endings: None Reported    Modes of Intervention: Support and Activity      Discipline Responsible: /Counselor      Signature:  RAMIN Ness

## 2021-08-08 NOTE — GROUP NOTE
Group Therapy Note    Date: 8/8/2021    Group Start Time: 1615  Group End Time: 1801  Group Topic: [address] Marciano Morris RN        Group Therapy Note    Attendees: 15     No [place] Talk Scattegories    Patient's Goal:  To share and learn positive words    Notes:  Patient actively participated and was engaged. Status After Intervention:  Improved    Participation Level: Active Listener and Interactive    Participation Quality: Appropriate, Attentive and Sharing      Speech:  normal      Thought Process/Content: Linear      Affective Functioning: Congruent      Mood: anxious and depressed      Level of consciousness:  Alert and Attentive      Response to Learning: Able to verbalize current knowledge/experience and Able to verbalize/acknowledge new learning      Endings: None Reported    Modes of Intervention: Activity      Discipline Responsible: Registered Nurse      Signature:   Jerman Gurrola RN

## 2021-08-08 NOTE — BH NOTE
Patient needed to be moved to room 2307-1 due to needing her room for a male patient. Patent requesting something for anxiety 8/10. Patient medicated with Ativan 1 mg po for anxiety.

## 2021-08-08 NOTE — PROGRESS NOTES
Comments: + interactions, + contribution, and + engagement.         Time: 9628-7424      Type of Group: Wrap up/Relaxation      Level of Participation: 13/22

## 2021-08-08 NOTE — PLAN OF CARE
Problem: Altered Mood, Depressive Behavior:  Goal: Able to verbalize acceptance of life and situations over which he or she has no control  Description: Able to verbalize acceptance of life and situations over which he or she has no control  Outcome: Ongoing  Goal: Able to verbalize and/or display a decrease in depressive symptoms  Description: Able to verbalize and/or display a decrease in depressive symptoms  Outcome: Ongoing  Goal: Ability to disclose and discuss suicidal ideas will improve  Description: Ability to disclose and discuss suicidal ideas will improve  Outcome: Ongoing  Goal: Absence of self-harm  Description: Absence of self-harm  Outcome: Ongoing    Patient is interactive with staff. Patient denies SI/HI/AVH. Patient states, \"I'm angry this morning because I thought I would be able to see the doctor and go home today. \" Dr. Ashlee Gallo notified. Patient states they slept poor last night - \"they moved me in the middle of the night to a room with another patient in it. \" She reports feeling \"angry\" Patient compliant with medications. Patient is irritable and cooperative at times.
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
## 2021-08-08 NOTE — CARE COORDINATION
[address] [facility]  Treatment Team Note  Review Date & Time:08/07/2021 0900       Patient was not in treatment team      Status EXAM:   Status and Exam  Normal: No  Facial Expression: Worried, Sad  Affect: Congruent  Level of Consciousness: Alert  Mood:Normal: No  Mood: Depressed, Anxious, Irritable, Sad  Motor Activity:Normal: Yes  Interview Behavior: Cooperative, Irritable  Preception: [place] to Person, [place] Mention to Time, [place] to Place, [place] to Situation  Attention:Normal: No  Attention: Distractible  Thought Processes: Circumstantial  Thought Content:Normal: No  Thought Content: Preoccupations  Hallucinations: None (Pt Denies)  Delusions: No  Memory:Normal: No  Memory: Poor Remote  Insight and Judgment: No  Insight and Judgment: Poor Judgment, Poor Insight  Present Suicidal Ideation: No  Present Homicidal Ideation: No      Suicide Risk CSSR-S:  1) Within the past month, have you wished you were dead or wished you could go to sleep and not wake up? : Yes  2) Have you actually had any thoughts of killing yourself? : Yes  3) Have you been thinking about how you might kill yourself? : Yes  5) Have you started to work out or worked out the details of how to kill yourself? Do you intend to carry out this plan? : No  6) Have you ever done anything, started to do anything, or prepared to do anything to end your life?: Yes      PLAN/TREATMENT RECOMMENDATIONS UPDATE: Patient will take medication as prescribed, eat 75% of meals, attend groups, participate in milieu activities, participate in treatment team and care planning for discharge and follow up.           Camelia Clay RN

## 2021-08-09 VITALS
DIASTOLIC BLOOD PRESSURE: 70 MMHG | TEMPERATURE: 97.5 F | WEIGHT: 190 LBS | BODY MASS INDEX: 32.44 KG/M2 | HEIGHT: 64 IN | SYSTOLIC BLOOD PRESSURE: 145 MMHG | HEART RATE: 98 BPM | RESPIRATION RATE: 16 BRPM | OXYGEN SATURATION: 98 %

## 2021-08-09 PROCEDURE — 97165 OT EVAL LOW COMPLEX 30 MIN: CPT

## 2021-08-09 PROCEDURE — 6370000000 HC RX 637 (ALT 250 FOR IP): Performed by: PSYCHIATRY & NEUROLOGY

## 2021-08-09 PROCEDURE — 97535 SELF CARE MNGMENT TRAINING: CPT

## 2021-08-09 PROCEDURE — 5130000000 HC BRIDGE APPOINTMENT

## 2021-08-09 PROCEDURE — 99239 HOSP IP/OBS DSCHRG MGMT >30: CPT | Performed by: PSYCHIATRY & NEUROLOGY

## 2021-08-09 RX ORDER — HYDROXYZINE PAMOATE 50 MG/1
50 CAPSULE ORAL 3 TIMES DAILY PRN
Qty: 90 CAPSULE | Refills: 0 | Status: SHIPPED | OUTPATIENT
Start: 2021-08-09 | End: 2021-09-08

## 2021-08-09 RX ORDER — TRAZODONE HYDROCHLORIDE 50 MG/1
50 TABLET ORAL NIGHTLY
Qty: 30 TABLET | Refills: 0 | Status: SHIPPED | OUTPATIENT
Start: 2021-08-09 | End: 2021-10-08 | Stop reason: SDUPTHER

## 2021-08-09 RX ADMIN — SERTRALINE HYDROCHLORIDE 50 MG: 50 TABLET ORAL at 09:02

## 2021-08-09 RX ADMIN — LORAZEPAM 1 MG: 1 TABLET ORAL at 13:56

## 2021-08-09 NOTE — GROUP NOTE
Group Therapy Note    Date: 8/8/2021    Group Start Time: 2030  Group End Time: 2115  Group Topic: Wrap-Up    [address] [facility]        Group Therapy Note    Attendees: Goals and importance of goal setting discussed. Night time milieu activities discussed. Patient's Goal:  Talk about past more    Notes:  Successful     Status After Intervention:  Improved    Participation Level:  Active Listener and Interactive    Participation Quality: Appropriate and Attentive      Speech:  normal      Thought Process/Content: Logical  Linear      Affective Functioning: Congruent      Mood: anxious      Level of consciousness:  Alert and Oriented x4      Response to Learning: Progressing to goal      Endings: None Reported    Modes of Intervention: Support      Discipline Responsible: [employer]      Signature:  Kianna Cornelius
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
## 2021-08-09 NOTE — FLOWSHEET NOTE
Purposeful Rounding     Patient Location: Patient room     Patient willing to engage in conversation: Yes     Presentation/behavior: Withdrawn     Affect: Sad     Concerns reported: None     PRN medications given: N/A     Environmental assessment: No safety hazards noted     Fall prevention interventions in place: Patient not currently a fall risk     Daily [place] Fall Risk Score: 81     Daily Wolff Fall Risk Score: Low

## 2021-08-09 NOTE — GROUP NOTE
Group Therapy Note    Date: 8/9/2021    Group Start Time: 1000  Group End Time: 6550  Group Topic: [address] [facility] Avelino, MSW        Group Therapy Note    Mode of Intervention: Hellen Analysis, Psychoeducation    Music Selection: Have You Ever Seen the Rain by CCR    Group facilitator provided presentation of live music, led discussion of lyrics with focus on challenging negative perspectives and \"the filters through which we see the world\". Group concluded with education discussion of distraction vs releasing coping skills. Attendees: 13         Notes:  Pt present and attentive to hellen analysis discussion, actively engaged in collaborative discussion of distraction vs releasing coping skills, provided preferred examples of each. Status After Intervention:  Improved    Participation Level:  Active Listener and Interactive    Participation Quality: Appropriate      Speech:  normal      Thought Process/Content: Logical      Affective Functioning: Congruent      Mood: euthymic      Level of consciousness:  Alert and Attentive      Response to Learning: Capable of insight and Progressing to goal      Endings: None Reported    Modes of Intervention: Education, Socialization, Exploration, Activity and Media      Discipline Responsible: Psychoeducational Specialist      Signature:  Lola Boyd MM, MT-BC

## 2021-08-09 NOTE — PLAN OF CARE
Problem: Altered Mood, Depressive Behavior:  Goal: Able to verbalize and/or display a decrease in depressive symptoms  Description: Able to verbalize and/or display a decrease in depressive symptoms  8/8/2021 2308 by Matt Zheng RN  Outcome: Ongoing     Problem: Altered Mood, Depressive Behavior:  Goal: Ability to disclose and discuss suicidal ideas will improve  Description: Ability to disclose and discuss suicidal ideas will improve  8/8/2021 2308 by Matt Zheng RN  Outcome: Ongoing   Patient calm and cooperative. Patient denies all. Patient attended groups and compliant with medications. Patient more visible and social in evening. Hoping to discharge in the morning.

## 2021-08-09 NOTE — PROGRESS NOTES
[facility]  Discharge Note    Pt discharged with followings belongings:   Dentures: None  Vision - Corrective Lenses: None  Hearing Aid: None  Jewelry: None  Body Piercings Removed: N/A  Clothing: Pants, Shirt, Undergarments (Comment), Footwear (black book bag in locker)  Were All Patient Medications Collected?: Not Applicable  Other Valuables: None   Valuables sent home with patient. Patient education on aftercare instructions:yes  . Patient verbalize understanding of AVS:  yes. Status EXAM upon discharge:  Status and Exam  Normal: No  Facial Expression: Sad  Affect: Congruent  Level of Consciousness: Alert  Mood:Normal: No  Mood: Anxious  Motor Activity:Normal: Yes  Interview Behavior: Cooperative  Preception: [place] to Person, [place] to Time, [place] to Situation, [place] to Place  Attention:Normal: No  Attention: Distractible  Thought Processes: Circumstantial  Thought Content:Normal: No  Thought Content: Preoccupations  Hallucinations: None  Delusions: No  Memory:Normal: No  Memory: Poor Remote  Insight and Judgment: No  Insight and Judgment: Poor Judgment, Poor Insight  Present Suicidal Ideation: No  Present Homicidal Ideation: No      Metabolic Screening:    Lab Results   Component Value Date    LABA1C 5.0 08/06/2021       Lab Results   Component Value Date    CHOL 171 08/06/2021     Lab Results   Component Value Date    TRIG 172 (H) 08/06/2021     Lab Results   Component Value Date    HDL 48 08/06/2021     No components found for: [facility]  Lab Results   Component Value Date    LABVLDL 34 08/06/2021       Nima Bueno RN   Bridge Appointment completed: Reviewed Discharge Instructions with patient. Patient verbalizes understanding and agreement with the discharge plan using the teachback method.      Referral for Outpatient Tobacco Cessation Counseling, upon discharge (wisam X if applicable and completed):    ( )  Hospital staff assisted patient to call Quit Line or faxed referral during hospitalization                  ( )  Recognizing danger situations (included triggers and roadblocks), if not completed on admission                    ( )  Coping skills (new ways to manage stress, exercise, relaxation techniques, changing routine, distraction), if not completed on admission                                                           ( )  Basic information about quitting (benefits of quitting, techniques in how to quit, available resources, if not completed on admission  ( ) Referral for counseling faxed to [facility]   ( ) Patient refused referral  ( ) Patient refused counseling  (x ) Patient refused smoking cessation medication upon discharge    Vaccinations (wisam X if applicable and completed):  ( ) Patient states already received influenza vaccine elsewhere  ( ) Patient received influenza vaccine during this hospitalization  ( ) Patient refused influenza vaccine at this time  (X) Influenza vaccine not offered at this time

## 2021-08-09 NOTE — SUICIDE SAFETY PLAN
SAFETY PLAN    A suicide Safety Plan is a document that supports someone when they are having thoughts of suicide. Warning Signs that indicate a suicidal crisis may be developing: What (situations, thoughts, feelings, body sensations, behaviors, etc.) do you experience that lets you know you are beginning to think about suicide? 1. isolation  2. anger  3. Sadness, shaking, smells of fire  Internal Coping Strategies:  What things can I do (relaxation techniques, hobbies, physical activities, etc.) to take my mind off my problems without contacting another person? 1. Draw  2. Breathing exercises  3. exercise    People and social settings that provide distraction: Who can I call or where can I go to distract me? 1. Name: Mom  Phone:   2. Name: sister  Phone:    3. Place:             4. Place:     People whom I can ask for help: Who can I call when I need help - for example, friends, family, clergy, someone else? 1. Name: Mom                Phone: [phone]  2. Name: [facility]  Phone: [phone]      Professionals or [address] [place] [address] I can contact during a crisis: Who can I call for help - for example, my doctor, my psychiatrist, my psychologist, a mental health provider, a suicide hotline? 1. Clinician Name: [facility] GLORIA   Phone: [phone]          3. Suicide Prevention Lifeline: 3-435-375-TALK (7374)    4. [address] [place] [address] Emergency Services -  for example, [facility] suicide hotline, [facility] Hotline: [facility]      Emergency Services Address:[phone]      E    Making the environment safe: How can I make my environment (house/apartment/living space) safer? For example, can I remove guns, medications, and other items? 1.  Take myself out of the situation  2. breath

## 2021-08-09 NOTE — FLOWSHEET NOTE
Purposeful Rounding     Patient Location: Patient room     Patient willing to engage in conversation: No,asleep     Presentation/behavior: Withdrawn     Affect: Sad     Concerns reported: None     PRN medications given: N/A     Environmental assessment: No safety hazards noted     Fall prevention interventions in place: Patient not currently a fall risk     Daily [place] Fall Risk Score: 81     Daily Wolff Fall Risk Score: Low

## 2021-08-09 NOTE — PLAN OF CARE
[facility]  Treatment Team Note  Review Date & Time: 08/09/21  1000    Patient was not in treatment team      Status EXAM:   Status and Exam  Normal: No  Facial Expression: Sad  Affect: Congruent  Level of Consciousness: Alert  Mood:Normal: No  Mood: Anxious  Motor Activity:Normal: Yes  Interview Behavior: Cooperative  Preception: [place] to Person, [place] Angel to Time, [place] to Situation, [place] to Place  Attention:Normal: No  Attention: Distractible  Thought Processes: Circumstantial  Thought Content:Normal: No  Thought Content: Preoccupations  Hallucinations: None  Delusions: No  Memory:Normal: No  Memory: Poor Remote  Insight and Judgment: No  Insight and Judgment: Poor Judgment, Poor Insight  Present Suicidal Ideation: No  Present Homicidal Ideation: No      Suicide Risk CSSR-S:  1) Within the past month, have you wished you were dead or wished you could go to sleep and not wake up? : Yes  2) Have you actually had any thoughts of killing yourself? : Yes  3) Have you been thinking about how you might kill yourself? : Yes  5) Have you started to work out or worked out the details of how to kill yourself? Do you intend to carry out this plan? : No  6) Have you ever done anything, started to do anything, or prepared to do anything to end your life?: Yes      PLAN/TREATMENT RECOMMENDATIONS UPDATE:   Patient will take medication as prescribed, eat 75% of meals, attend groups, participate in milieu activities, participate in treatment team and care planning for discharge and follow up.           Vidhi Trevino RN
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
## 2021-10-06 SDOH — ECONOMIC STABILITY: FOOD INSECURITY: WITHIN THE PAST 12 MONTHS, YOU WORRIED THAT YOUR FOOD WOULD RUN OUT BEFORE YOU GOT MONEY TO BUY MORE.: NEVER TRUE

## 2021-10-06 SDOH — ECONOMIC STABILITY: HOUSING INSECURITY
IN THE LAST 12 MONTHS, WAS THERE A TIME WHEN YOU DID NOT HAVE A STEADY PLACE TO SLEEP OR SLEPT IN A SHELTER (INCLUDING NOW)?: NO

## 2021-10-06 SDOH — ECONOMIC STABILITY: FOOD INSECURITY: WITHIN THE PAST 12 MONTHS, THE FOOD YOU BOUGHT JUST DIDN'T LAST AND YOU DIDN'T HAVE MONEY TO GET MORE.: NEVER TRUE

## 2021-10-06 SDOH — ECONOMIC STABILITY: TRANSPORTATION INSECURITY
IN THE PAST 12 MONTHS, HAS THE LACK OF TRANSPORTATION KEPT YOU FROM MEDICAL APPOINTMENTS OR FROM GETTING MEDICATIONS?: NO

## 2021-10-06 SDOH — ECONOMIC STABILITY: INCOME INSECURITY: IN THE LAST 12 MONTHS, WAS THERE A TIME WHEN YOU WERE NOT ABLE TO PAY THE MORTGAGE OR RENT ON TIME?: NO

## 2021-10-06 SDOH — ECONOMIC STABILITY: TRANSPORTATION INSECURITY
IN THE PAST 12 MONTHS, HAS LACK OF TRANSPORTATION KEPT YOU FROM MEETINGS, WORK, OR FROM GETTING THINGS NEEDED FOR DAILY LIVING?: NO

## 2021-10-06 ASSESSMENT — PATIENT HEALTH QUESTIONNAIRE - PHQ9
SUM OF ALL RESPONSES TO PHQ9 QUESTIONS 1 & 2: 1
SUM OF ALL RESPONSES TO PHQ QUESTIONS 1-9: 1
1. LITTLE INTEREST OR PLEASURE IN DOING THINGS: 0
SUM OF ALL RESPONSES TO PHQ QUESTIONS 1-9: 1
SUM OF ALL RESPONSES TO PHQ QUESTIONS 1-9: 1
2. FEELING DOWN, DEPRESSED OR HOPELESS: 1

## 2021-10-06 ASSESSMENT — SOCIAL DETERMINANTS OF HEALTH (SDOH): HOW HARD IS IT FOR YOU TO PAY FOR THE VERY BASICS LIKE FOOD, HOUSING, MEDICAL CARE, AND HEATING?: NOT HARD AT ALL

## 2021-10-08 ENCOUNTER — VIRTUAL VISIT (OUTPATIENT)
Dept: FAMILY MEDICINE CLINIC | Age: 26
End: 2021-10-08
Payer: MEDICARE

## 2021-10-08 DIAGNOSIS — R45.89 DEPRESSED MOOD: Primary | ICD-10-CM

## 2021-10-08 DIAGNOSIS — R31.9 HEMATURIA, UNSPECIFIED TYPE: ICD-10-CM

## 2021-10-08 DIAGNOSIS — F41.9 ANXIETY: ICD-10-CM

## 2021-10-08 PROCEDURE — G8427 DOCREV CUR MEDS BY ELIG CLIN: HCPCS | Performed by: FAMILY MEDICINE

## 2021-10-08 PROCEDURE — 99203 OFFICE O/P NEW LOW 30 MIN: CPT | Performed by: FAMILY MEDICINE

## 2021-10-08 RX ORDER — HYDROXYZINE 50 MG/1
50 TABLET, FILM COATED ORAL EVERY 8 HOURS PRN
Qty: 30 TABLET | Refills: 1 | Status: SHIPPED | OUTPATIENT
Start: 2021-10-08 | End: 2021-11-24 | Stop reason: SDUPTHER

## 2021-10-08 RX ORDER — TRAZODONE HYDROCHLORIDE 50 MG/1
50 TABLET ORAL NIGHTLY
Qty: 30 TABLET | Refills: 1 | Status: SHIPPED | OUTPATIENT
Start: 2021-10-08 | End: 2021-12-10 | Stop reason: SDUPTHER

## 2021-10-08 NOTE — PROGRESS NOTES
10/8/2021    TELEHEALTH EVALUATION -- Audio/Visual (During [id number] public health emergency)    HPI:    Emeka Fontenot (:  1995) has requested an audio/video evaluation for the following concern(s):    Pt presents today via doxy. me Video visit as a new pt to establish a PCP. PMH includes depression and insomnia. Currently taking Zoloft 50 mg daily and Trazodone 50 mg at night. Was hospitalized at [facility] for depression and SI from  - 21. Drug screen showed cannabis. States today that it has been 10 years since her last her last doctor. Also taking taking Hydroxyzine 50 mg TID, seeing Mort Rounds for psych assessment for court regarding  Child custody. Father of her children is trying to get custody. UA from  showed hematuria. Has bee nwthout medication, denies SI. Review of Systems   Psychiatric/Behavioral: Positive for dysphoric mood. Negative for suicidal ideas. The patient is nervous/anxious. Prior to Visit Medications    Medication Sig Taking?  Authorizing Provider   sertraline (ZOLOFT) 50 MG tablet Take 1 tablet by mouth daily Yes Carson Baker, DO   traZODone (DESYREL) 50 MG tablet Take 1 tablet by mouth nightly Yes Carson Baker, DO   hydrOXYzine (ATARAX) 50 MG tablet Take 1 tablet by mouth every 8 hours as needed for Anxiety (prn) Yes Carson Baker, DO       Social History     Tobacco Use    Smoking status: Current Every Day Smoker     Packs/day: 1.00     Years: 11.00     Pack years: 11.00     Types: Cigarettes    Smokeless tobacco: Never Used   Vaping Use    Vaping Use: Never used   Substance Use Topics    Alcohol use: Not Currently     Comment: 3 months last drink    Drug use: Not Currently     Types: Marijuana     Comment: 1 month ago        Allergies   Allergen Reactions    Red Dye    ,   Past Medical History:   Diagnosis Date    Anemia     Depression     Gastric ulcer, unspecified as acute or chronic, without mention of hemorrhage, perforation, or mood  - refilled Zoloft 50 mg    3. Anxiety  - refilled Hydroxyzine 50 TID prn  - refilled Trazodone 50 mg at night    Return in about 2 months (around 12/8/2021) for Physical Exam.    Maria Del Rosario Fontenot, was evaluated through a synchronous (real-time) audio-video encounter. The patient (or guardian if applicable) is aware that this is a billable service. Verbal consent to proceed has been obtained within the past 12 months. The visit was conducted pursuant to the emergency declaration under the [address] [place] [address], [address] [place] [address] authority and the [employer] and [employer] General Act. Patient identification was verified, and a caregiver was present when appropriate. The patient was located in a state where the provider was credentialed to provide care. Total time spent on this encounter: Not billed by time    --Shawna Toth DO on 10/8/2021 at 2:21 PM    An electronic signature was used to authenticate this note.

## 2021-10-29 ENCOUNTER — APPOINTMENT (OUTPATIENT)
Dept: CT IMAGING | Age: 26
End: 2021-10-29
Payer: MEDICARE

## 2021-10-29 ENCOUNTER — HOSPITAL ENCOUNTER (EMERGENCY)
Age: 26
Discharge: HOME | End: 2021-10-29
Attending: STUDENT IN AN ORGANIZED HEALTH CARE EDUCATION/TRAINING PROGRAM
Payer: MEDICARE

## 2021-10-29 VITALS
DIASTOLIC BLOOD PRESSURE: 68 MMHG | OXYGEN SATURATION: 99 % | TEMPERATURE: 97.8 F | HEART RATE: 66 BPM | SYSTOLIC BLOOD PRESSURE: 103 MMHG | RESPIRATION RATE: 16 BRPM

## 2021-10-29 DIAGNOSIS — K11.20 SIALOADENITIS: Primary | ICD-10-CM

## 2021-10-29 DIAGNOSIS — K11.5 SIALOLITHIASIS OF SUBMANDIBULAR GLAND: ICD-10-CM

## 2021-10-29 LAB
A/G RATIO: 1.6 (ref 1.1–2.2)
ALBUMIN SERPL-MCNC: 4.7 G/DL (ref 3.4–5)
ALP BLD-CCNC: 59 U/L (ref 40–129)
ALT SERPL-CCNC: 27 U/L (ref 10–40)
ANION GAP SERPL CALCULATED.3IONS-SCNC: 12 MMOL/L (ref 3–16)
AST SERPL-CCNC: 16 U/L (ref 15–37)
BASOPHILS ABSOLUTE: 0.1 K/UL (ref 0–0.2)
BASOPHILS RELATIVE PERCENT: 0.9 %
BILIRUB SERPL-MCNC: 0.4 MG/DL (ref 0–1)
BUN BLDV-MCNC: 6 MG/DL (ref 7–20)
CALCIUM SERPL-MCNC: 9.7 MG/DL (ref 8.3–10.6)
CHLORIDE BLD-SCNC: 100 MMOL/L (ref 99–110)
CO2: 22 MMOL/L (ref 21–32)
CREAT SERPL-MCNC: <0.5 MG/DL (ref 0.6–1.1)
EKG ATRIAL RATE: 81 BPM
EKG DIAGNOSIS: NORMAL
EKG P AXIS: 10 DEGREES
EKG P-R INTERVAL: 142 MS
EKG Q-T INTERVAL: 388 MS
EKG QRS DURATION: 90 MS
EKG QTC CALCULATION (BAZETT): 450 MS
EKG R AXIS: 41 DEGREES
EKG T AXIS: 41 DEGREES
EKG VENTRICULAR RATE: 81 BPM
EOSINOPHILS ABSOLUTE: 0.1 K/UL (ref 0–0.6)
EOSINOPHILS RELATIVE PERCENT: 1.9 %
GFR AFRICAN AMERICAN: >60
GFR NON-AFRICAN AMERICAN: >60
GLUCOSE BLD-MCNC: 89 MG/DL (ref 70–99)
HCG QUALITATIVE: NEGATIVE
HCT VFR BLD CALC: 43.6 % (ref 36–48)
HEMOGLOBIN: 15.1 G/DL (ref 12–16)
LACTIC ACID, SEPSIS: 0.5 MMOL/L (ref 0.4–1.9)
LYMPHOCYTES ABSOLUTE: 2.3 K/UL (ref 1–5.1)
LYMPHOCYTES RELATIVE PERCENT: 34.6 %
MCH RBC QN AUTO: 32.4 PG (ref 26–34)
MCHC RBC AUTO-ENTMCNC: 34.7 G/DL (ref 31–36)
MCV RBC AUTO: 93.5 FL (ref 80–100)
MONOCYTES ABSOLUTE: 0.3 K/UL (ref 0–1.3)
MONOCYTES RELATIVE PERCENT: 5 %
NEUTROPHILS ABSOLUTE: 3.9 K/UL (ref 1.7–7.7)
NEUTROPHILS RELATIVE PERCENT: 57.6 %
PDW BLD-RTO: 13.2 % (ref 12.4–15.4)
PLATELET # BLD: 196 K/UL (ref 135–450)
PMV BLD AUTO: 8.2 FL (ref 5–10.5)
POTASSIUM REFLEX MAGNESIUM: 3.7 MMOL/L (ref 3.5–5.1)
RBC # BLD: 4.66 M/UL (ref 4–5.2)
SODIUM BLD-SCNC: 134 MMOL/L (ref 136–145)
TOTAL PROTEIN: 7.6 G/DL (ref 6.4–8.2)
WBC # BLD: 6.8 K/UL (ref 4–11)

## 2021-10-29 PROCEDURE — 6360000004 HC RX CONTRAST MEDICATION: Performed by: STUDENT IN AN ORGANIZED HEALTH CARE EDUCATION/TRAINING PROGRAM

## 2021-10-29 PROCEDURE — 83605 ASSAY OF LACTIC ACID: CPT

## 2021-10-29 PROCEDURE — 80053 COMPREHEN METABOLIC PANEL: CPT

## 2021-10-29 PROCEDURE — 70491 CT SOFT TISSUE NECK W/DYE: CPT

## 2021-10-29 PROCEDURE — 84703 CHORIONIC GONADOTROPIN ASSAY: CPT

## 2021-10-29 PROCEDURE — 2580000003 HC RX 258: Performed by: STUDENT IN AN ORGANIZED HEALTH CARE EDUCATION/TRAINING PROGRAM

## 2021-10-29 PROCEDURE — 96374 THER/PROPH/DIAG INJ IV PUSH: CPT

## 2021-10-29 PROCEDURE — 93010 ELECTROCARDIOGRAM REPORT: CPT | Performed by: INTERNAL MEDICINE

## 2021-10-29 PROCEDURE — 85025 COMPLETE CBC W/AUTO DIFF WBC: CPT

## 2021-10-29 PROCEDURE — 93005 ELECTROCARDIOGRAM TRACING: CPT | Performed by: STUDENT IN AN ORGANIZED HEALTH CARE EDUCATION/TRAINING PROGRAM

## 2021-10-29 PROCEDURE — 99283 EMERGENCY DEPT VISIT LOW MDM: CPT

## 2021-10-29 PROCEDURE — 96375 TX/PRO/DX INJ NEW DRUG ADDON: CPT

## 2021-10-29 PROCEDURE — 6360000002 HC RX W HCPCS: Performed by: STUDENT IN AN ORGANIZED HEALTH CARE EDUCATION/TRAINING PROGRAM

## 2021-10-29 RX ORDER — 0.9 % SODIUM CHLORIDE 0.9 %
1000 INTRAVENOUS SOLUTION INTRAVENOUS ONCE
Status: COMPLETED | OUTPATIENT
Start: 2021-10-29 | End: 2021-10-29

## 2021-10-29 RX ORDER — KETOROLAC TROMETHAMINE 30 MG/ML
15 INJECTION, SOLUTION INTRAMUSCULAR; INTRAVENOUS ONCE
Status: COMPLETED | OUTPATIENT
Start: 2021-10-29 | End: 2021-10-29

## 2021-10-29 RX ORDER — DEXAMETHASONE SODIUM PHOSPHATE 10 MG/ML
10 INJECTION INTRAMUSCULAR; INTRAVENOUS ONCE
Status: COMPLETED | OUTPATIENT
Start: 2021-10-29 | End: 2021-10-29

## 2021-10-29 RX ADMIN — DEXAMETHASONE SODIUM PHOSPHATE 10 MG: 10 INJECTION INTRAMUSCULAR; INTRAVENOUS at 13:19

## 2021-10-29 RX ADMIN — SODIUM CHLORIDE 1000 ML: 9 INJECTION, SOLUTION INTRAVENOUS at 13:34

## 2021-10-29 RX ADMIN — KETOROLAC TROMETHAMINE 15 MG: 30 INJECTION, SOLUTION INTRAMUSCULAR at 13:19

## 2021-10-29 RX ADMIN — IOPAMIDOL 75 ML: 755 INJECTION, SOLUTION INTRAVENOUS at 13:40

## 2021-10-29 ASSESSMENT — PAIN SCALES - GENERAL
PAINLEVEL_OUTOF10: 10
PAINLEVEL_OUTOF10: 9

## 2021-10-29 ASSESSMENT — ENCOUNTER SYMPTOMS
ABDOMINAL PAIN: 0
COUGH: 0
FACIAL SWELLING: 1
PHOTOPHOBIA: 0
STRIDOR: 0
RHINORRHEA: 0
BACK PAIN: 0
SHORTNESS OF BREATH: 1
VOMITING: 0
NAUSEA: 0
SORE THROAT: 0

## 2021-10-29 ASSESSMENT — PAIN DESCRIPTION - LOCATION: LOCATION: THROAT

## 2021-10-29 ASSESSMENT — PAIN DESCRIPTION - PAIN TYPE: TYPE: ACUTE PAIN

## 2021-10-29 NOTE — ED PROVIDER NOTES
[address] ED  EMERGENCY DEPARTMENT ENCOUNTER      Pt Name: Leilani Siddiqui  MRN: [id number]  [place] 1995  Date of evaluation: 10/29/2021  Provider: Mata No DO    CHIEF COMPLAINT       Chief Complaint   Patient presents with    Shortness of Breath     patient reports swollen lymph nodes in neck, feels like it is very hard to breathe \"my throat swells really big and then goes down\"  trouble sleeping, no new meds recently, reports history PTSD/anxiety-currently out of hydroxizine-         HISTORY OF PRESENT ILLNESS   (Location/Symptom, Timing/Onset, Context/Setting, Quality, Duration, Modifying Factors, Severity)  Note limiting factors. Leilani Siddiqui is a 27 y.o. female who presents to the emergency department complaining of left-sided head neck swelling, reports trouble sleeping due to feeling like she cannot breathe due to neck swelling. Does feel the sensation of her tongue being pushed up. History of poor dentition but denies dental pain. No fevers no chills. No stridor. Talking full sentences on arrival.  Does complain of shortness of breath but mainly doing to subjective put pressure on her throat. Nursing Notes were reviewed.     PAST MEDICAL HISTORY     Past Medical History:   Diagnosis Date    Anemia     Depression     Gastric ulcer, unspecified as acute or chronic, without mention of hemorrhage, perforation, or obstruction     Rh incompatibility     Thyroid disease          SURGICAL HISTORY       Past Surgical History:   Procedure Laterality Date     SECTION       SECTION       SECTION      [place]  DELIVERY ONLY N/A 10/6/2018     SECTION performed by Ivette Houston [facility] at [facility]    TONSILLECTOMY      TYMPANOSTOMY TUBE PLACEMENT           CURRENT MEDICATIONS       Previous Medications    HYDROXYZINE (ATARAX) 50 MG TABLET    Take 1 tablet by mouth every 8 hours as needed for Anxiety (prn)    SERTRALINE (ZOLOFT) 50 MG TABLET    Take 1 tablet by mouth daily    TRAZODONE (DESYREL) 50 MG TABLET    Take 1 tablet by mouth nightly       ALLERGIES     Red dye    FAMILY HISTORY       Family History   Problem Relation Age of Onset    Heart Disease Father     Other Mother         polyp on ovary    Heart Disease Maternal Grandmother     Alcohol Abuse Maternal Grandfather     Heart Disease Maternal Grandfather           SOCIAL HISTORY       Social History     Socioeconomic History    Marital status: Single     Spouse name: Not on file    Number of children: 3    Years of education: 7    Highest education level: Not on file   Occupational History    Not on file   Tobacco Use    Smoking status: Current Every Day Smoker     Packs/day: 0.50     Years: 11.00     Pack years: 5.50     Types: Cigarettes    Smokeless tobacco: Never Used   Vaping Use    Vaping Use: Never used   Substance and Sexual Activity    Alcohol use: Not Currently    Drug use: Not Currently     Types: Marijuana     Comment: occasionally    Sexual activity: Not Currently     Partners: Male   Other Topics Concern    Not on file   Social History Narrative    Not on file     Social Determinants of Health     Financial Resource Strain: Low Risk     Difficulty of Paying Living Expenses: Not hard at all   Food Insecurity: No Food Insecurity    Worried About Running Out of Food in the Last Year: Never true    Brad of Food in the Last Year: Never true   Transportation Needs: No Transportation Needs    Lack of Transportation (Medical): No    Lack of Transportation (Non-Medical):  No   Physical Activity:     Days of Exercise per Week:     Minutes of Exercise per Session:    Stress:     Feeling of Stress :    Social Connections:     Frequency of Communication with Friends and Family:     Frequency of Social Gatherings with Friends and Family:     Attends [employer] Services:     Active Member of Clubs or Organizations:     Attends Club or Organization Meetings:     Marital Status:    Intimate Partner Violence:     Fear of Current or Ex-Partner:     Emotionally Abused:     Physically Abused:     Sexually Abused:        SCREENINGS                            REVIEW OF SYSTEMS    (2-9 systems for level 4, 10 or more for level 5)   Review of Systems   Constitutional: Negative for chills and fever. HENT: Positive for dental problem and facial swelling. Negative for congestion, rhinorrhea and sore throat. Eyes: Negative for photophobia and visual disturbance. Respiratory: Positive for shortness of breath. Negative for cough and stridor. Cardiovascular: Negative for chest pain, palpitations and leg swelling. Gastrointestinal: Negative for abdominal pain, nausea and vomiting. Genitourinary: Negative for decreased urine volume. Musculoskeletal: Negative for back pain, neck pain and neck stiffness. Skin: Negative for rash. Hematological: Positive for adenopathy. Psychiatric/Behavioral: Negative for confusion. PHYSICAL EXAM    (up to 7 for level 4, 8 or more for level 5)   RECENT VITALS:     Temp: 97.8 °F (36.6 °C),  Pulse: 90, Resp: 22, BP: 115/73, SpO2: 97 %    Physical Exam  Constitutional:       General: She is not in acute distress. Appearance: She is not diaphoretic. HENT:      Head: Normocephalic and atraumatic. Mouth/Throat:      Comments: Poor dentition. Eyes:      Pupils: Pupils are equal, round, and reactive to light. Neck:      Trachea: No tracheal deviation. Comments: Patient has swelling to the left-sided submandibular space. No overlying cellulitis or crepitus. Cardiovascular:      Rate and Rhythm: Normal rate and regular rhythm. Pulmonary:      Effort: Pulmonary effort is normal. No respiratory distress. Abdominal:      General: There is no distension. Palpations: Abdomen is soft. Musculoskeletal:         General: Normal range of motion.       Cervical back: Normal range of motion and neck supple. Skin:     General: Skin is warm. Neurological:      Mental Status: She is oriented to person, place, and time. DIAGNOSTIC RESULTS     EKG: All EKG's are interpreted by the Emergency Department Physician who either signs or Co-signs this chart in the absence of a cardiologist.      The Ekg interpreted by me shows  normal sinus rhythm with a rate of 81  Axis is   Normal  QTc is  normal  Intervals and Durations are unremarkable. ST Segments: nonspecific changes    RADIOLOGY:   Non-plain film images such as CT, Ultrasound and MRI are read by the radiologist. Plain radiographic images are visualized and preliminarily interpreted by the emergency physician. Interpretation per the Radiologist below, if available at the time of this note:    CT SOFT TISSUE NECK W CONTRAST   Final Result   1. Left submandibular sialadenitis with a 0.3 cm proximal submandibular duct   sialolith. .   2. Mildly enlarged sublingual lymph node is nonspecific and likely reactive   in nature.                LABS:  Labs Reviewed   COMPREHENSIVE METABOLIC PANEL W/ REFLEX TO MG FOR LOW K - Abnormal; Notable for the following components:       Result Value    Sodium 134 (*)     BUN 6 (*)     CREATININE <0.5 (*)     All other components within normal limits    Narrative:     Performed at:  [address] [address],  [place], [facility]   Phone [phone]   CBC WITH AUTO DIFFERENTIAL    Narrative:     Performed at:  [address] [address],  [place], [facility]   Phone [phone]   LACTATE, SEPSIS    Narrative:     Performed at:  [address] [address],  [place], [facility]   Phone [phone]   HCG, SERUM, QUALITATIVE    Narrative:     Performed at:  [address] [address],  [place], [facility]   Phone [phone]   LACTATE, SEPSIS       All other labs were within normal range or not returned as of this dictation. EMERGENCY DEPARTMENT COURSE and DIFFERENTIAL DIAGNOSIS/MDM:   Kamari Johns is a 27 y.o. female who presents to the emergency department with the complaint of presenting with left-sided submandibular swelling. Does not seem to cross midline, no overlying skin change erythema crepitus. No trismus. Does have poor dentition without obvious signs of dental abscess. Due to size of swelling will obtain blood work, CT imaging soft tissue neck. At this point did no emergent airway compromise suspected, no stridor speaking full sentences lung fields are clear. Patient's laboratory evaluation was unremarkable. Patient was noted to have sialoadenitis with a 3 mm sialolithiasis to left submandibular duct. I was able to express nonpurulent fluid through duct as well as removal of duct stone. Patient was encouraged to continue sialagogues at home returning if she develops fever worsening facial swelling uncontrolled pain inability to open mouth. CRITICAL CARE TIME   Total Critical Care time was 0 minutes, excluding separately reportable procedures. There was a high probability of clinically significant/life threatening deterioration in the patient's condition which required my urgent intervention. Clinical concern   Intervention     CONSULTS:  None    PROCEDURES:  Unless otherwise noted below, none     Procedures        FINAL IMPRESSION      1. Sialoadenitis    2. Sialolithiasis of submandibular gland          DISPOSITION/PLAN   DISPOSITION  dc      PATIENT REFERRED TO:  [facility]  [address]  [phone]    If symptoms worsen      DISCHARGE MEDICATIONS:  New Prescriptions    No medications on file     Controlled Substances Monitoring:     No flowsheet data found.     (Please note that portions of this note were completed with a voice recognition program.  Efforts were made to edit the dictations but occasionally words are mis-transcribed.)    Tati Holcomb DO (electronically signed)  Attending Emergency Physician              Tati Holcomb DO  10/29/21 [id number]

## 2021-11-23 ENCOUNTER — TELEPHONE (OUTPATIENT)
Dept: FAMILY MEDICINE CLINIC | Age: 26
End: 2021-11-23

## 2021-11-23 NOTE — TELEPHONE ENCOUNTER
Patient needs a refill on Hydroxyzine. They need a 30 day supply.      Mail order or local pharmacy: local    [place] [facility]    Patient  or mail to patient(If mail order):     Last OV - VV 10/8/21    Future Appointments   Date Time Provider Angel Monte   1/25/2022  2:30 PM DO Anthony Dunham

## 2021-11-24 RX ORDER — HYDROXYZINE 50 MG/1
50 TABLET, FILM COATED ORAL EVERY 8 HOURS PRN
Qty: 90 TABLET | Refills: 0 | Status: SHIPPED | OUTPATIENT
Start: 2021-11-24 | End: 2022-03-30 | Stop reason: SDUPTHER

## 2021-12-10 RX ORDER — TRAZODONE HYDROCHLORIDE 50 MG/1
50 TABLET ORAL NIGHTLY
Qty: 30 TABLET | Refills: 1 | Status: SHIPPED | OUTPATIENT
Start: 2021-12-10 | End: 2022-01-25 | Stop reason: DRUGHIGH

## 2022-01-25 ENCOUNTER — OFFICE VISIT (OUTPATIENT)
Dept: FAMILY MEDICINE CLINIC | Age: 27
End: 2022-01-25
Payer: MEDICARE

## 2022-01-25 VITALS
TEMPERATURE: 97.5 F | SYSTOLIC BLOOD PRESSURE: 108 MMHG | WEIGHT: 186 LBS | HEIGHT: 65 IN | BODY MASS INDEX: 30.99 KG/M2 | HEART RATE: 91 BPM | OXYGEN SATURATION: 97 % | DIASTOLIC BLOOD PRESSURE: 80 MMHG

## 2022-01-25 DIAGNOSIS — Z00.00 ANNUAL PHYSICAL EXAM: Primary | ICD-10-CM

## 2022-01-25 DIAGNOSIS — R31.9 HEMATURIA, UNSPECIFIED TYPE: Primary | ICD-10-CM

## 2022-01-25 DIAGNOSIS — H53.9 VISION DISTURBANCE: ICD-10-CM

## 2022-01-25 DIAGNOSIS — K11.8 SALIVARY GLAND OBSTRUCTION: ICD-10-CM

## 2022-01-25 DIAGNOSIS — R31.9 HEMATURIA, UNSPECIFIED TYPE: ICD-10-CM

## 2022-01-25 DIAGNOSIS — L53.9 FACIAL ERYTHEMA: ICD-10-CM

## 2022-01-25 LAB
BILIRUBIN, POC: NEGATIVE
BLOOD URINE, POC: NORMAL
CLARITY, POC: NORMAL
COLOR, POC: NORMAL
GLUCOSE URINE, POC: NEGATIVE
KETONES, POC: NORMAL
LEUKOCYTE EST, POC: NEGATIVE
NITRITE, POC: NEGATIVE
PH, POC: 7
PROTEIN, POC: NEGATIVE
SPECIFIC GRAVITY, POC: 1.02
UROBILINOGEN, POC: NORMAL

## 2022-01-25 PROCEDURE — G8482 FLU IMMUNIZE ORDER/ADMIN: HCPCS | Performed by: FAMILY MEDICINE

## 2022-01-25 PROCEDURE — 90674 CCIIV4 VAC NO PRSV 0.5 ML IM: CPT | Performed by: FAMILY MEDICINE

## 2022-01-25 PROCEDURE — 90471 IMMUNIZATION ADMIN: CPT | Performed by: FAMILY MEDICINE

## 2022-01-25 PROCEDURE — 99395 PREV VISIT EST AGE 18-39: CPT | Performed by: FAMILY MEDICINE

## 2022-01-25 RX ORDER — TRAZODONE HYDROCHLORIDE 100 MG/1
100 TABLET ORAL NIGHTLY
Qty: 90 TABLET | Refills: 1 | Status: SHIPPED | OUTPATIENT
Start: 2022-01-25 | End: 2022-08-18

## 2022-01-25 ASSESSMENT — PATIENT HEALTH QUESTIONNAIRE - PHQ9
2. FEELING DOWN, DEPRESSED OR HOPELESS: 2
4. FEELING TIRED OR HAVING LITTLE ENERGY: 2
SUM OF ALL RESPONSES TO PHQ QUESTIONS 1-9: 16
6. FEELING BAD ABOUT YOURSELF - OR THAT YOU ARE A FAILURE OR HAVE LET YOURSELF OR YOUR FAMILY DOWN: 1
1. LITTLE INTEREST OR PLEASURE IN DOING THINGS: 0
SUM OF ALL RESPONSES TO PHQ QUESTIONS 1-9: 16
7. TROUBLE CONCENTRATING ON THINGS, SUCH AS READING THE NEWSPAPER OR WATCHING TELEVISION: 3
5. POOR APPETITE OR OVEREATING: 3
3. TROUBLE FALLING OR STAYING ASLEEP: 3
10. IF YOU CHECKED OFF ANY PROBLEMS, HOW DIFFICULT HAVE THESE PROBLEMS MADE IT FOR YOU TO DO YOUR WORK, TAKE CARE OF THINGS AT HOME, OR GET ALONG WITH OTHER PEOPLE: 2
SUM OF ALL RESPONSES TO PHQ QUESTIONS 1-9: 16
SUM OF ALL RESPONSES TO PHQ9 QUESTIONS 1 & 2: 2
9. THOUGHTS THAT YOU WOULD BE BETTER OFF DEAD, OR OF HURTING YOURSELF: 0
SUM OF ALL RESPONSES TO PHQ QUESTIONS 1-9: 16
8. MOVING OR SPEAKING SO SLOWLY THAT OTHER PEOPLE COULD HAVE NOTICED. OR THE OPPOSITE, BEING SO FIGETY OR RESTLESS THAT YOU HAVE BEEN MOVING AROUND A LOT MORE THAN USUAL: 2

## 2022-01-25 ASSESSMENT — ENCOUNTER SYMPTOMS
COLOR CHANGE: 1
BACK PAIN: 0
ABDOMINAL PAIN: 0
SHORTNESS OF BREATH: 1

## 2022-01-25 NOTE — PROGRESS NOTES
Toña Jade  YOB: 1995    Date of Service:  2022    Chief Complaint:   Toña Jade is a 27 y.o. male who presents for complete physical examination.     HPI:  HPI    Hx of abnormal PAP smear: No  Self breast exams: yes  Sexual activity: none   Last eye exam: 13years old, abnormal - got glasses  Exercise: walks and dances and takes stairs  Seatbelt use: yes  Are You a Spiritual person: yes  Living will: no    Wt Readings from Last 3 Encounters:   22 186 lb (84.4 kg)   18 182 lb (82.6 kg)   18 185 lb 6.5 oz (84.1 kg)     BP Readings from Last 3 Encounters:   22 108/80   10/29/21 103/68   18 108/72       Patient Active Problem List   Diagnosis    Previous  section     deliv due to previous difficult deliv, deliv, curr hospitaliz    Major depressive disorder, recurrent ([facility] [address]    PTSD (post-traumatic stress disorder)    Changes in vision    Alcohol abuse    Microscopic hematuria    Tobacco abuse       Health Maintenance   Topic Date Due    Varicella vaccine (1 of 2 - 2-dose childhood series) Never done    COVID-19 Vaccine (1) Never done    Pneumococcal 0-64 years Vaccine (1 of 2 - PPSV23) Never done    Pap smear  Never done    Flu vaccine (1) Never done    Depression Monitoring  10/06/2022    DTaP/Tdap/Td vaccine (5 - Td or Tdap) 10/08/2028    Hepatitis C screen  Completed    HIV screen  Completed    Hepatitis A vaccine  Aged Out    Hepatitis B vaccine  Aged Out    Hib vaccine  Aged Out    Meningococcal (ACWY) vaccine  Aged [employer] History   Administered Date(s) Administered    Rho (D) Immune Globulin 2014    Tdap (Boostrix, Adacel) 2014, 10/08/2018       Allergies   Allergen Reactions    Red Dye      Outpatient Medications Marked as Taking for the 22 encounter (Office Visit) with Juani Santana, DO   Medication Sig Dispense Refill    traZODone (DESYREL) 100 MG tablet Take 1 tablet by mouth nightly 90 tablet 1    sertraline (ZOLOFT) 50 MG tablet Take 1 tablet by mouth daily 30 tablet 1       Past Medical History:   Diagnosis Date    Anemia     Depression     Gastric ulcer, unspecified as acute or chronic, without mention of hemorrhage, perforation, or obstruction     Rh incompatibility     Thyroid disease      Past Surgical History:   Procedure Laterality Date     SECTION       SECTION       SECTION      ID  DELIVERY ONLY N/A 10/6/2018     SECTION performed by Karson Romero MD at [facility] L[facility] OR    TONSILLECTOMY      TYMPANOSTOMY TUBE PLACEMENT       Family History   Problem Relation Age of Onset    Heart Disease Father     Other Mother         polyp on ovary    Heart Disease Maternal Grandmother     Alcohol Abuse Maternal Grandfather     Heart Disease Maternal Grandfather      Social History     Socioeconomic History    Marital status: Single     Spouse name: Not on file    Number of children: 3    Years of education: 7    Highest education level: Not on file   Occupational History    Not on file   Tobacco Use    Smoking status: Current Every Day Smoker     Packs/day: 0.50     Years: 11.00     Pack years: 5.50     Types: Cigarettes    Smokeless tobacco: Never Used   Vaping Use    Vaping Use: Never used   Substance and Sexual Activity    Alcohol use: Not Currently    Drug use: Not Currently     Types: Marijuana Dolph [place])     Comment: occasionally    Sexual activity: Not Currently     Partners: Male   Other Topics Concern    Not on file   Social History Narrative    Not on file     Social Determinants of Health     Financial Resource Strain: Low Risk     Difficulty of Paying Living Expenses: Not hard at all   Food Insecurity: No Food Insecurity    Worried About Running Out of Food in the Last Year: Never true    Brad of Food in the Last Year: Never true   Transportation Needs: No Transportation Needs    Lack of Transportation (Medical): No    Lack of Transportation (Non-Medical): No   Physical Activity:     Days of Exercise per Week: Not on file    Minutes of Exercise per Session: Not on file   Stress:     Feeling of Stress : Not on file   Social Connections:     Frequency of Communication with Friends and Family: Not on file    Frequency of Social Gatherings with Friends and Family: Not on file    Attends Taoist Services: Not on file    Active Member of Clubs or Organizations: Not on file    Attends Club or Organization Meetings: Not on file    Marital Status: Not on file   Intimate Partner Violence:     Fear of Current or Ex-Partner: Not on file    Emotionally Abused: Not on file    Physically Abused: Not on file    Sexually Abused: Not on file   Housing Stability: Unknown    Unable to Pay for Housing in the Last Year: No    Number of [place] in the Last Year: Not on file    Unstable Housing in the Last Year: No       Review ofSystems:  Review of Systems   Constitutional: Negative for fatigue. HENT:        Left salivary gland swelling for 1 month   Eyes: Positive for visual disturbance (periodic). Respiratory: Positive for shortness of breath. Cardiovascular: Negative for leg swelling. Gastrointestinal: Negative for abdominal pain. Endocrine: Positive for cold intolerance and heat intolerance. Genitourinary: Negative for difficulty urinating. Musculoskeletal: Positive for neck pain. Negative for back pain. Skin: Positive for color change (face). Allergic/Immunologic: Negative for environmental allergies. Neurological: Positive for light-headedness (periodic). Negative for dizziness. Hematological: Positive for adenopathy (left cervical). Psychiatric/Behavioral: Positive for sleep disturbance. The patient is nervous/anxious.         PhysicalExam:   Vitals:    01/25/22 1436   BP: 108/80   Site: Left Upper Arm   Position: Sitting   Pulse: 91   Temp: 97.5 °F (36.4 °C) TempSrc: Temporal   SpO2: 97%   Weight: 186 lb (84.4 kg)   Height: 5' 5\" (1.651 m)     Body mass index is 30.95 kg/m². Physical Exam  Vitals reviewed. Constitutional:       Appearance: She is well-developed. HENT:      Head: Normocephalic and atraumatic. Mouth/Throat:      Comments: Dental caries   Eyes:      Pupils: Pupils are equal, round, and reactive to light. Cardiovascular:      Rate and Rhythm: Normal rate and regular rhythm. Heart sounds: Normal heart sounds. Pulmonary:      Effort: Pulmonary effort is normal.      Breath sounds: Normal breath sounds. Abdominal:      General: Bowel sounds are normal.   Musculoskeletal:      Cervical back: Normal range of motion. Tenderness present. Lymphadenopathy:      Cervical: Cervical adenopathy (left) present. Skin:     Findings: Erythema (facial) present. Neurological:      Mental Status: She is alert and oriented to person, place, and time. Psychiatric:         Behavior: Behavior normal.         Thought Content: Thought content normal.         Judgment: Judgment normal.         Assessment/Plan:   Diagnosis Orders   1. Annual physical exam     2. Salivary gland obstruction  [facility] Priyanka Barboza DO, Otolaryngology, [facility]   3. Vision disturbance  JEREMIAH - Dianna Watson MD, Ophthalmology, [facility]   4. Facial erythema  External Referral To Dermatology     Return in about 1 year (around 1/25/2023) for Physical Exam.    Prior to Visit Medications    Medication Sig Taking?  Authorizing Provider   traZODone (DESYREL) 100 MG tablet Take 1 tablet by mouth nightly Yes Stephani Cooks, DO   sertraline (ZOLOFT) 50 MG tablet Take 1 tablet by mouth daily Yes Stephani Cooks, DO

## 2022-01-25 NOTE — PROGRESS NOTES
Vaccine Information Sheet, \"Influenza - Inactivated\"  given to Tevin Abarca, or parent/legal guardian of  Tevin Abarca and verbalized understanding. Patient responses:    Have you ever had a reaction to a flu vaccine? No  Are you able to eat eggs without adverse effects? Yes  Do you have any current illness? No  Have you ever had Guillian [place] Syndrome? No    Flu vaccine given per order. Please see immunization tab.

## 2022-01-26 ENCOUNTER — TELEPHONE (OUTPATIENT)
Dept: ENT CLINIC | Age: 27
End: 2022-01-26

## 2022-01-26 LAB — URINE CULTURE, ROUTINE: NORMAL

## 2022-01-26 NOTE — TELEPHONE ENCOUNTER
Ms Sophia Crespo we have a referral from your Dr to come to [facility] to see our Dr Veronica Lindsey  For your salivary glands if you would like to use this referral  Please call our office [phone] thank you

## 2022-03-30 ENCOUNTER — TELEPHONE (OUTPATIENT)
Dept: FAMILY MEDICINE CLINIC | Age: 27
End: 2022-03-30

## 2022-03-30 RX ORDER — HYDROXYZINE 50 MG/1
50 TABLET, FILM COATED ORAL EVERY 8 HOURS PRN
Qty: 90 TABLET | Refills: 0 | Status: SHIPPED | OUTPATIENT
Start: 2022-03-30 | End: 2022-05-25

## 2022-03-30 NOTE — TELEPHONE ENCOUNTER
Patient needs a refill on Atarax. They need a 30 day supply. Mail order or local pharmacy: local    Pharmacy: [address] [place] [address]    Patient  or mail to patient(If mail order):     Last OV 1/25/22    No future appointments.

## 2022-05-23 ENCOUNTER — APPOINTMENT (OUTPATIENT)
Dept: CT IMAGING | Age: 27
End: 2022-05-23
Payer: MEDICARE

## 2022-05-23 ENCOUNTER — HOSPITAL ENCOUNTER (EMERGENCY)
Age: 27
Discharge: HOME | End: 2022-05-23
Attending: EMERGENCY MEDICINE
Payer: MEDICARE

## 2022-05-23 VITALS
BODY MASS INDEX: 30.73 KG/M2 | HEIGHT: 64 IN | TEMPERATURE: 98.3 F | OXYGEN SATURATION: 100 % | RESPIRATION RATE: 16 BRPM | DIASTOLIC BLOOD PRESSURE: 86 MMHG | HEART RATE: 71 BPM | WEIGHT: 180 LBS | SYSTOLIC BLOOD PRESSURE: 112 MMHG

## 2022-05-23 DIAGNOSIS — K55.069 MESENTERIC VENOUS THROMBOSIS (HCC): Primary | ICD-10-CM

## 2022-05-23 DIAGNOSIS — K21.9 GASTROESOPHAGEAL REFLUX DISEASE, UNSPECIFIED WHETHER ESOPHAGITIS PRESENT: ICD-10-CM

## 2022-05-23 LAB
A/G RATIO: 1.5 (ref 1.1–2.2)
ALBUMIN SERPL-MCNC: 4.8 G/DL (ref 3.4–5)
ALP BLD-CCNC: 63 U/L (ref 40–129)
ALT SERPL-CCNC: 20 U/L (ref 10–40)
ANION GAP SERPL CALCULATED.3IONS-SCNC: 10 MMOL/L (ref 3–16)
AST SERPL-CCNC: 41 U/L (ref 15–37)
BASOPHILS ABSOLUTE: 0.1 K/UL (ref 0–0.2)
BASOPHILS RELATIVE PERCENT: 0.9 %
BILIRUB SERPL-MCNC: 0.5 MG/DL (ref 0–1)
BILIRUBIN URINE: NEGATIVE
BLOOD, URINE: NEGATIVE
BUN BLDV-MCNC: 7 MG/DL (ref 7–20)
CALCIUM SERPL-MCNC: 9 MG/DL (ref 8.3–10.6)
CHLORIDE BLD-SCNC: 101 MMOL/L (ref 99–110)
CLARITY: CLEAR
CO2: 24 MMOL/L (ref 21–32)
COLOR: YELLOW
CREAT SERPL-MCNC: <0.5 MG/DL (ref 0.6–1.1)
EOSINOPHILS ABSOLUTE: 0.1 K/UL (ref 0–0.6)
EOSINOPHILS RELATIVE PERCENT: 1 %
GFR AFRICAN AMERICAN: >60
GFR NON-AFRICAN AMERICAN: >60
GLUCOSE BLD-MCNC: 96 MG/DL (ref 70–99)
GLUCOSE URINE: NEGATIVE MG/DL
HCG(URINE) PREGNANCY TEST: NEGATIVE
HCT VFR BLD CALC: 41.5 % (ref 36–48)
HEMOGLOBIN: 14.2 G/DL (ref 12–16)
INR BLD: 0.99 (ref 0.88–1.12)
KETONES, URINE: NEGATIVE MG/DL
LEUKOCYTE ESTERASE, URINE: NEGATIVE
LIPASE: 29 U/L (ref 13–60)
LYMPHOCYTES ABSOLUTE: 3.2 K/UL (ref 1–5.1)
LYMPHOCYTES RELATIVE PERCENT: 36.2 %
MCH RBC QN AUTO: 31.4 PG (ref 26–34)
MCHC RBC AUTO-ENTMCNC: 34.2 G/DL (ref 31–36)
MCV RBC AUTO: 91.8 FL (ref 80–100)
MICROSCOPIC EXAMINATION: NORMAL
MONOCYTES ABSOLUTE: 0.5 K/UL (ref 0–1.3)
MONOCYTES RELATIVE PERCENT: 5.3 %
NEUTROPHILS ABSOLUTE: 5.1 K/UL (ref 1.7–7.7)
NEUTROPHILS RELATIVE PERCENT: 56.6 %
NITRITE, URINE: NEGATIVE
PDW BLD-RTO: 14.1 % (ref 12.4–15.4)
PH UA: 6.5 (ref 5–8)
PLATELET # BLD: 292 K/UL (ref 135–450)
PLATELET SLIDE REVIEW: ADEQUATE
PMV BLD AUTO: 8.4 FL (ref 5–10.5)
POTASSIUM REFLEX MAGNESIUM: 5.1 MMOL/L (ref 3.5–5.1)
PROTEIN UA: NEGATIVE MG/DL
PROTHROMBIN TIME: 11.2 SEC (ref 9.9–12.7)
RBC # BLD: 4.52 M/UL (ref 4–5.2)
SODIUM BLD-SCNC: 135 MMOL/L (ref 136–145)
SPECIFIC GRAVITY UA: 1.01 (ref 1–1.03)
TOTAL PROTEIN: 7.9 G/DL (ref 6.4–8.2)
TROPONIN: <0.01 NG/ML
URINE REFLEX TO CULTURE: NORMAL
URINE TYPE: NORMAL
UROBILINOGEN, URINE: 0.2 E.U./DL
WBC # BLD: 8.9 K/UL (ref 4–11)

## 2022-05-23 PROCEDURE — 84484 ASSAY OF TROPONIN QUANT: CPT

## 2022-05-23 PROCEDURE — 6370000000 HC RX 637 (ALT 250 FOR IP): Performed by: EMERGENCY MEDICINE

## 2022-05-23 PROCEDURE — 80053 COMPREHEN METABOLIC PANEL: CPT

## 2022-05-23 PROCEDURE — 81003 URINALYSIS AUTO W/O SCOPE: CPT

## 2022-05-23 PROCEDURE — 74177 CT ABD & PELVIS W/CONTRAST: CPT

## 2022-05-23 PROCEDURE — 84703 CHORIONIC GONADOTROPIN ASSAY: CPT

## 2022-05-23 PROCEDURE — 6370000000 HC RX 637 (ALT 250 FOR IP): Performed by: PHYSICIAN ASSISTANT

## 2022-05-23 PROCEDURE — 93005 ELECTROCARDIOGRAM TRACING: CPT | Performed by: PHYSICIAN ASSISTANT

## 2022-05-23 PROCEDURE — 6360000002 HC RX W HCPCS: Performed by: PHYSICIAN ASSISTANT

## 2022-05-23 PROCEDURE — 2580000003 HC RX 258: Performed by: PHYSICIAN ASSISTANT

## 2022-05-23 PROCEDURE — 85025 COMPLETE CBC W/AUTO DIFF WBC: CPT

## 2022-05-23 PROCEDURE — 96375 TX/PRO/DX INJ NEW DRUG ADDON: CPT

## 2022-05-23 PROCEDURE — 99285 EMERGENCY DEPT VISIT HI MDM: CPT

## 2022-05-23 PROCEDURE — 85610 PROTHROMBIN TIME: CPT

## 2022-05-23 PROCEDURE — 6360000004 HC RX CONTRAST MEDICATION: Performed by: PHYSICIAN ASSISTANT

## 2022-05-23 PROCEDURE — 96374 THER/PROPH/DIAG INJ IV PUSH: CPT

## 2022-05-23 PROCEDURE — 83690 ASSAY OF LIPASE: CPT

## 2022-05-23 RX ORDER — KETOROLAC TROMETHAMINE 30 MG/ML
15 INJECTION, SOLUTION INTRAMUSCULAR; INTRAVENOUS ONCE
Status: COMPLETED | OUTPATIENT
Start: 2022-05-23 | End: 2022-05-23

## 2022-05-23 RX ORDER — PANTOPRAZOLE SODIUM 40 MG/1
40 TABLET, DELAYED RELEASE ORAL
Qty: 60 TABLET | Refills: 0 | Status: SHIPPED | OUTPATIENT
Start: 2022-05-23

## 2022-05-23 RX ORDER — ONDANSETRON 2 MG/ML
4 INJECTION INTRAMUSCULAR; INTRAVENOUS EVERY 6 HOURS PRN
Status: DISCONTINUED | OUTPATIENT
Start: 2022-05-23 | End: 2022-05-23 | Stop reason: HOSPADM

## 2022-05-23 RX ORDER — 0.9 % SODIUM CHLORIDE 0.9 %
1000 INTRAVENOUS SOLUTION INTRAVENOUS ONCE
Status: COMPLETED | OUTPATIENT
Start: 2022-05-23 | End: 2022-05-23

## 2022-05-23 RX ORDER — SUCRALFATE 1 G/1
1 TABLET ORAL 4 TIMES DAILY
Qty: 120 TABLET | Refills: 0 | Status: SHIPPED | OUTPATIENT
Start: 2022-05-23

## 2022-05-23 RX ORDER — PANTOPRAZOLE SODIUM 40 MG/1
40 TABLET, DELAYED RELEASE ORAL ONCE
Status: COMPLETED | OUTPATIENT
Start: 2022-05-23 | End: 2022-05-23

## 2022-05-23 RX ADMIN — APIXABAN 10 MG: 5 TABLET, FILM COATED ORAL at 19:21

## 2022-05-23 RX ADMIN — IOPAMIDOL 75 ML: 755 INJECTION, SOLUTION INTRAVENOUS at 16:06

## 2022-05-23 RX ADMIN — ONDANSETRON HYDROCHLORIDE 4 MG: 2 INJECTION, SOLUTION INTRAMUSCULAR; INTRAVENOUS at 16:01

## 2022-05-23 RX ADMIN — PANTOPRAZOLE SODIUM 40 MG: 40 TABLET, DELAYED RELEASE ORAL at 19:22

## 2022-05-23 RX ADMIN — SODIUM CHLORIDE 1000 ML: 9 INJECTION, SOLUTION INTRAVENOUS at 16:00

## 2022-05-23 RX ADMIN — KETOROLAC TROMETHAMINE 15 MG: 30 INJECTION, SOLUTION INTRAMUSCULAR; INTRAVENOUS at 16:01

## 2022-05-23 RX ADMIN — LIDOCAINE HYDROCHLORIDE: 20 SOLUTION ORAL; TOPICAL at 17:42

## 2022-05-23 ASSESSMENT — PAIN DESCRIPTION - DESCRIPTORS: DESCRIPTORS: SHARP;STABBING

## 2022-05-23 ASSESSMENT — PAIN SCALES - GENERAL
PAINLEVEL_OUTOF10: 3
PAINLEVEL_OUTOF10: 7

## 2022-05-23 ASSESSMENT — ENCOUNTER SYMPTOMS
ABDOMINAL PAIN: 1
RESPIRATORY NEGATIVE: 1
NAUSEA: 1

## 2022-05-23 ASSESSMENT — PAIN - FUNCTIONAL ASSESSMENT: PAIN_FUNCTIONAL_ASSESSMENT: 0-10

## 2022-05-23 ASSESSMENT — PAIN DESCRIPTION - LOCATION: LOCATION: ABDOMEN

## 2022-05-23 NOTE — ED NOTES
1728 - sent perfect serve to Dr. Savage Luciano had called back at [phone] about another pt. Chanel Calvillo  05/23/22 1729    Dr. Savage Luciano will call back about this pt.  Wants to look up chart before talking to Tamia Pillai  05/23/22 1737    1842 - Dr. Savage Luciano called back and spoke with Dr. Denisha Davila  05/23/22 1848

## 2022-05-23 NOTE — ED PROVIDER NOTES
[address] ED  EMERGENCY DEPARTMENT ENCOUNTER        Pt Name: Guilherme Crocker  MRN: [id number]  [place] 1995  Date of evaluation: 5/23/2022  Provider: Sukumar Lynch PA-C  PCP: Shadi Murillo DO  Note Started: 3:44 PM EDT        I have seen and evaluated this patient with my supervising physician Rosanna Jones, [address] [place] [address]       Chief Complaint   Patient presents with    Abdominal Pain     Pt states abdominal pain x3 weeks. Pt states that she thought is was anxiety and states that she has missed a period. Pt denies sexual activity for years. HISTORY OF PRESENT ILLNESS   (Location, Timing/Onset, Context/Setting, Quality, Duration, Modifying Factors, Severity, Associated Signs and Symptoms)  Note limiting factors. Chief Complaint: Left lower abdominal pain     Guilherme Crocker is a 27 y.o. female with a past medical history of PTSD, depression, alcohol use, tobacco use, thyroid disease, history of gastric ulcer brought in today by private vehicle with complaints of left-sided lower abdominal pain x3 weeks. Onset of symptoms x3 weeks. Duration of symptoms have been persistent since onset. Context includes left lower abdominal pain. Endorses nausea but denies vomiting. Denies any diarrhea or blood in her stool. Denies urinary symptoms. Denies fevers or chills. Denies cough or congestion. Denies chest pain or shortness of breath. No aggravating or alleviating symptoms. She otherwise denies any other complaints. Nothing seems to make symptoms better or worse. She has not taken anything at home. Denies recent abdominal surgeries or sick contacts. Nursing Notes were all reviewed and agreed with or any disagreements were addressed in the HPI. REVIEW OF SYSTEMS    (2-9 systems for level 4, 10 or more for level 5)     Review of Systems   Constitutional: Negative. Respiratory: Negative. Cardiovascular: Negative.     Gastrointestinal: Positive for abdominal pain and nausea. Genitourinary: Negative. Musculoskeletal: Negative. Skin: Negative. Neurological: Negative. Positives and Pertinent negatives as per HPI. Except as noted above in the ROS, all other systems were reviewed and negative.        PAST MEDICAL HISTORY     Past Medical History:   Diagnosis Date    Anemia     Depression     Gastric ulcer, unspecified as acute or chronic, without mention of hemorrhage, perforation, or obstruction     Rh incompatibility     Thyroid disease          SURGICAL HISTORY     Past Surgical History:   Procedure Laterality Date     SECTION       SECTION       SECTION      DC  DELIVERY ONLY N/A 10/6/2018     SECTION performed by Cheyenne Sever, [facility] at [facility] L[facility]    TONSILLECTOMY      TYMPANOSTOMY TUBE PLACEMENT           CURRENTMEDICATIONS       Previous Medications    SERTRALINE (ZOLOFT) 50 MG TABLET    TAKE 1 TABLET BY MOUTH DAILY    TRAZODONE (DESYREL) 100 MG TABLET    Take 1 tablet by mouth nightly         ALLERGIES     Red dye    FAMILYHISTORY       Family History   Problem Relation Age of Onset    Heart Disease Father     Other Mother         polyp on ovary    Heart Disease Maternal Grandmother     Alcohol Abuse Maternal Grandfather     Heart Disease Maternal Grandfather           SOCIAL HISTORY       Social History     Tobacco Use    Smoking status: Current Every Day Smoker     Packs/day: 0.50     Years: 11.00     Pack years: 5.50     Types: Cigarettes    Smokeless tobacco: Never Used   Vaping Use    Vaping Use: Never used   Substance Use Topics    Alcohol use: Not Currently    Drug use: Not Currently     Types: Marijuana (Weed)     Comment: occasionally       SCREENINGS    [place] Coma Scale  Eye Opening: Spontaneous  Best Verbal Response: Oriented  Best Motor Response: Obeys commands  [place] Coma Scale Score: 15        PHYSICAL EXAM    (up to 7 for level 4, 8 or more for level 5)     ED Triage Vitals [05/23/22 1331]   BP Temp Temp Source Pulse Resp SpO2 Height Weight   128/77 98.3 °F (36.8 °C) Oral 62 16 98 % 5' 4\" (1.626 m) 180 lb (81.6 kg)       Physical Exam  Vitals and nursing note reviewed. Constitutional:       General: She is awake. She is not in acute distress. Appearance: Normal appearance. She is well-developed. She is obese. She is not ill-appearing, toxic-appearing or diaphoretic. HENT:      Head: Normocephalic and atraumatic. Nose: Nose normal.   Eyes:      General:         Right eye: No discharge. Left eye: No discharge. Cardiovascular:      Rate and Rhythm: Normal rate and regular rhythm. Pulses:           Radial pulses are 2+ on the right side and 2+ on the left side. Heart sounds: Normal heart sounds. No murmur heard. No gallop. Pulmonary:      Effort: Pulmonary effort is normal. No respiratory distress. Breath sounds: Normal breath sounds. No decreased breath sounds, wheezing, rhonchi or rales. Chest:      Chest wall: No tenderness. Abdominal:      General: Abdomen is flat. Bowel sounds are normal.      Palpations: Abdomen is soft. Tenderness: There is abdominal tenderness in the left upper quadrant and left lower quadrant. There is no right CVA tenderness, left CVA tenderness, guarding or rebound. Negative signs include Herrmann's sign and McBurney's sign. Musculoskeletal:         General: No deformity. Normal range of motion. Cervical back: Normal range of motion and neck supple. Right lower leg: No edema. Left lower leg: No edema. Skin:     General: Skin is warm and dry. Neurological:      Mental Status: She is alert and oriented to person, place, and time. Psychiatric:         Behavior: Behavior normal. Behavior is cooperative.          DIAGNOSTIC RESULTS   LABS:    Labs Reviewed   COMPREHENSIVE METABOLIC PANEL W/ REFLEX TO MG FOR LOW K - Abnormal; Notable for the following components:       Result Value Sodium 135 (*)     CREATININE <0.5 (*)     AST 41 (*)     All other components within normal limits   URINALYSIS WITH REFLEX TO CULTURE   PREGNANCY, URINE   CBC WITH AUTO DIFFERENTIAL   LIPASE   TROPONIN   PROTIME-INR       When ordered only abnormal lab results are displayed. All other labs were within normal range or not returned as of this dictation. EKG: When ordered, EKG's are interpreted by the Emergency Department Physician in the absence of a cardiologist.  Please see their note for interpretation of EKG. RADIOLOGY:   Non-plain film images such as CT, Ultrasound and MRI are read by the radiologist. Plain radiographic images are visualized and preliminarily interpreted by the ED Provider with the below findings:        Interpretation per the Radiologist below, if available at the time of this note:    CT ABDOMEN PELVIS W IV CONTRAST Additional Contrast? None   Final Result   1. Possible small superior mesenteric vein thrombus, with circumferential   flow versus artifactual mixing artifact (contrast and non-enhanced blood). May consider repeat IV contrast-enhanced CT abdomen (portal venous phase) in   1-2 days for recharacterization. 2. Remainder of the CT abdomen and pelvis appears unremarkable. 3.  No findings to suggest acute appendicitis; no ureter calculus or   hydronephrosis. No results found.         PROCEDURES   Unless otherwise noted below, none     Procedures    CRITICAL CARE TIME       CONSULTS:  IP CONSULT TO VASCULAR SURGERY  IP CONSULT TO HOSPITALIST      EMERGENCY DEPARTMENT COURSE and DIFFERENTIAL DIAGNOSIS/MDM:   Vitals:    Vitals:    05/23/22 1331 05/23/22 1601   BP: 128/77 (!) 104/58   Pulse: 62 64   Resp: 16 16   Temp: 98.3 °F (36.8 °C)    TempSrc: Oral    SpO2: 98% 98%   Weight: 180 lb (81.6 kg)    Height: 5' 4\" (1.626 m)        Patient was given the following medications:  Medications   ondansetron (ZOFRAN) injection 4 mg (4 mg IntraVENous Given 5/23/22 1601) aluminum & magnesium hydroxide-simethicone (MAALOX) 30 mL, lidocaine viscous hcl (XYLOCAINE) 5 mL (GI COCKTAIL) (has no administration in time range)   ketorolac (TORADOL) injection 15 mg (15 mg IntraVENous Given 5/23/22 1601)   0.9 % sodium chloride bolus (1,000 mLs IntraVENous New Bag 5/23/22 1600)   iopamidol (ISOVUE-370) 76 % injection 75 mL (75 mLs IntraVENous Given 5/23/22 1606)       Patient brought in today by private vehicle with 3-week history of left lower quadrant pain. On Exam she is alert oriented afebrile breathing on room air satting at 98%. Nontoxic in appearance. No acute respiratory distress. Old labs records reviewed. Patient seen and evaluated by myself and my attending Dr. Micheal Jorgensen.    CBC shows no white count. Hemoglobin of 14.2. Urine is negative for infection. Urine pregnancy is negative. No acute electrolyte abnormalities. Kidney and liver function unremarkable. Lipase of 29. Patient given Toradol Zofran and fluids. CT scan shows possible small superior mesenteric vein thrombus with circumferential flow versus artifactual mixing artifact may consider repeat IV contrast-enhanced CT abdomen in 1 to 2 days for regurgitation remainder of CT scan is unremarkable no findings of acute appendicitis no ureteral calculus or hydronephrosis. I did consult vascular given the CT scan findings and spoke to Dr. Donte Castillo who reports from a surgical standpoint there is nothing that vascular surgery needs to do at this time but he did recommend starting the patient on heparin and repeating the CT scan in 1 to 2 days to further evaluate the venous thrombus. Will start patient on heparin and plan to admit to the hospitalist.           FINAL IMPRESSION      1. Mesenteric venous thrombosis (HCC)          DISPOSITION/PLAN   DISPOSITION        PATIENT REFERRED TO:  No follow-up provider specified.     DISCHARGE MEDICATIONS:  New Prescriptions    No medications on file       DISCONTINUED MEDICATIONS:  Discontinued Medications    No medications on file              (Please note that portions of this note were completed with a voice recognition program.  Efforts were made to edit the dictations but occasionally words are mis-transcribed.)    Teetee Juarez PA-C (electronically signed)            Teetee Juarez PA-C  05/23/22 3401

## 2022-05-23 NOTE — ED NOTES
Saeid Santamaria (Ting Soliz) to start new case.  (Consult only for now)  Dx: Small superior mesenteric vein thrombus found on ct scan    1723 - Dr. Shraddha Roque called back via [place] [id number]  05/23/22 1724

## 2022-05-24 LAB
EKG ATRIAL RATE: 62 BPM
EKG DIAGNOSIS: NORMAL
EKG P AXIS: 3 DEGREES
EKG P-R INTERVAL: 154 MS
EKG Q-T INTERVAL: 440 MS
EKG QRS DURATION: 92 MS
EKG QTC CALCULATION (BAZETT): 446 MS
EKG R AXIS: 54 DEGREES
EKG T AXIS: 44 DEGREES
EKG VENTRICULAR RATE: 62 BPM

## 2022-05-24 PROCEDURE — 93010 ELECTROCARDIOGRAM REPORT: CPT | Performed by: INTERNAL MEDICINE

## 2022-05-25 RX ORDER — HYDROXYZINE 50 MG/1
TABLET, FILM COATED ORAL
Qty: 90 TABLET | Refills: 0 | Status: SHIPPED | OUTPATIENT
Start: 2022-05-25 | End: 2022-07-13

## 2022-05-25 NOTE — ED PROVIDER NOTES
I personally saw Arielle Dwyer and performed a substantive portion of the visit including all aspects of the medical decision making. .    In brief, this is a 47-year-old female presenting with abdominal pain. The patient states the pain is been occurring for the past 3 weeks. She states it worsened today to the point where she had to come into the ED. She denies any vomiting, food does not affect the pain. She denies any hematemesis or hematochezia. Denies any fevers or chills or lower abdominal pain. On exam, she is nontoxic-appearing. She does have tenderness to palpation in the left mid abdomen and left upper quadrant. There is no lower abdominal tenderness, no rebound or guarding. Abdomen is soft without peritoneal signs. Heart is regular rate and rhythm without murmurs. The Ekg interpreted by me shows  normal sinus rhythm with a rate of 62  Axis is   Normal  QTc is  within an acceptable range  Intervals and Durations are unremarkable. ST Segments: normal      ED course: The patient is a 47-year-old female presenting with abdominal pain. Her vital signs are within normal limits. She is nontoxic-appearing on exam.  Her abdomen is tender, but overall unremarkable. She had a work-up here that was largely unremarkable, no anemia, or significant Electrolyte abnormality. Lipase is normal.  hCG is negative. CT abdomen and pelvis did show a potential superior mesenteric venous thrombosis. The patient denies any history of hypercoagulable disorder. OMAIRA contacted vascular surgery who recommended admission for hypercoagulable work-up and heparin. Vascular stated no need for surgical intervention. The hospitalist was paged for admission, I received a call back from Dr. Naa Hammond regarding the patient.   As she stated that she reviewed the chart and spoke with Dr. Monica Rosales, hematologist.  He stated that the patient would not have an accurate hypercoagulable work-up when she is having an acute event and he would not even do the work-up at this time. He stated that he feels she can be discharged home on Eliquis. Dr. Jacek Wilcox relayed this to me. I do feel that this plan is reasonable since the patient is comfortable appearing, and has a benign abdominal exam.  The patient is also comfortable with this. She will be started on Eliquis starter pack. She denies history of intracranial bleed, or GI bleed. She is a former alcoholic but no significant alcohol consumption in several years. There is a question of the patient could have an element of gastritis, so especially since I started her on Eliquis I did start her on a Protonix and Carafate as well. She is understanding of the plan of care for home. She is given the Eliquis, told to follow-up with hematology and states she will call tomorrow for an appointment. She also will stop the Protonix and Carafate. I did speak with her at length regarding strict return precautions for bleeding as well as for worsening abdominal pain or any developing symptoms, and she voices understanding. All diagnostic, treatment, and disposition decisions were made by myself in conjunction with the advanced practice provider. For all further details of the patient's emergency department visit, please see the advanced practice provider's documentation. Comment: Please note this report has been produced using speech recognition software and may contain errors related to that system including errors in grammar, punctuation, and spelling, as well as words and phrases that may be inappropriate. If there are any questions or concerns please feel free to contact the dictating provider for clarification.          Rolando, [address] [place] [address]  05/24/22 6027

## 2022-05-27 ENCOUNTER — TELEPHONE (OUTPATIENT)
Dept: FAMILY MEDICINE CLINIC | Age: 27
End: 2022-05-27

## 2022-05-27 NOTE — TELEPHONE ENCOUNTER
Discharged from [facility] with Eliquis 5 mg 2 twice a day instructed to take it until her Sonogram 6/2. She only has enough for today. Can you fill this?     [address]

## 2022-05-29 ENCOUNTER — TELEPHONE (OUTPATIENT)
Dept: FAMILY MEDICINE CLINIC | Age: 27
End: 2022-05-29

## 2022-05-29 DIAGNOSIS — K55.069 MESENTERIC VEIN THROMBOSIS (HCC): Primary | ICD-10-CM

## 2022-05-29 NOTE — TELEPHONE ENCOUNTER
Called patient but she was not home. Spoke with patient's mother who stated she was on HIPAA. Mother states that patient has enough Eliquis for today, I sent in a new prescription for 30 days to the [facility] in [facility]. Patient has imaging on June 2, 2022 and patient's mother was unsure who patient will follow-up with after that. Instructed patient's mother to call our office on Tuesday with information on who and when patient has imaging follow-up with she speaks with her daughter.

## 2022-05-29 NOTE — PROGRESS NOTES
Called patient but she was not home. Spoke with patient's mother who stated she was on HIPAA. Mother states that patient has enough Eliquis for today, I sent in a new prescription for 30 days to the [facility]. Patient has imaging on June 2, 2022 and patient's mother was unsure who patient will follow-up with after that. Instructed patient's mother to call our office on Tuesday with information on who and when patient has imaging follow-up with she speaks with her daughter.

## 2022-06-07 ENCOUNTER — HOSPITAL ENCOUNTER (OUTPATIENT)
Dept: CT IMAGING | Age: 27
Discharge: HOME | End: 2022-06-07
Payer: MEDICARE

## 2022-06-07 DIAGNOSIS — K55.011: ICD-10-CM

## 2022-06-07 PROCEDURE — 74177 CT ABD & PELVIS W/CONTRAST: CPT

## 2022-06-07 PROCEDURE — 6360000004 HC RX CONTRAST MEDICATION: Performed by: INTERNAL MEDICINE

## 2022-06-07 PROCEDURE — A4641 RADIOPHARM DX AGENT NOC: HCPCS | Performed by: INTERNAL MEDICINE

## 2022-06-07 RX ADMIN — BARIUM SULFATE 450 ML: 21 SUSPENSION ORAL at 14:01

## 2022-06-07 RX ADMIN — IOPAMIDOL 75 ML: 755 INJECTION, SOLUTION INTRAVENOUS at 14:01

## 2022-07-13 RX ORDER — HYDROXYZINE 50 MG/1
TABLET, FILM COATED ORAL
Qty: 90 TABLET | Refills: 0 | Status: SHIPPED | OUTPATIENT
Start: 2022-07-13 | End: 2022-08-18

## 2022-08-18 RX ORDER — TRAZODONE HYDROCHLORIDE 100 MG/1
100 TABLET ORAL NIGHTLY
Qty: 90 TABLET | Refills: 1 | Status: SHIPPED | OUTPATIENT
Start: 2022-08-18

## 2022-08-18 RX ORDER — HYDROXYZINE 50 MG/1
TABLET, FILM COATED ORAL
Qty: 90 TABLET | Refills: 0 | Status: SHIPPED | OUTPATIENT
Start: 2022-08-18 | End: 2022-09-14

## 2022-09-14 RX ORDER — HYDROXYZINE 50 MG/1
TABLET, FILM COATED ORAL
Qty: 90 TABLET | Refills: 1 | Status: SHIPPED | OUTPATIENT
Start: 2022-09-14 | End: 2022-11-11

## 2022-11-11 RX ORDER — HYDROXYZINE 50 MG/1
TABLET, FILM COATED ORAL
Qty: 90 TABLET | Refills: 1 | Status: SHIPPED | OUTPATIENT
Start: 2022-11-11

## 2023-01-11 DIAGNOSIS — R45.89 DEPRESSED MOOD: Primary | ICD-10-CM

## 2023-01-11 RX ORDER — HYDROXYZINE 50 MG/1
TABLET, FILM COATED ORAL
Qty: 90 TABLET | Refills: 1 | Status: SHIPPED | OUTPATIENT
Start: 2023-01-11

## 2023-02-08 RX ORDER — TRAZODONE HYDROCHLORIDE 100 MG/1
100 TABLET ORAL NIGHTLY
Qty: 90 TABLET | Refills: 0 | Status: SHIPPED | OUTPATIENT
Start: 2023-02-08

## 2023-02-09 ENCOUNTER — TELEPHONE (OUTPATIENT)
Dept: FAMILY MEDICINE CLINIC | Age: 28
End: 2023-02-09

## 2023-03-10 RX ORDER — HYDROXYZINE 50 MG/1
TABLET, FILM COATED ORAL
Qty: 90 TABLET | Refills: 1 | Status: SHIPPED | OUTPATIENT
Start: 2023-03-10

## 2023-03-15 ENCOUNTER — APPOINTMENT (OUTPATIENT)
Dept: CT IMAGING | Age: 28
End: 2023-03-15
Payer: MEDICAID

## 2023-03-15 ENCOUNTER — TELEPHONE (OUTPATIENT)
Dept: PRIMARY CARE CLINIC | Age: 28
End: 2023-03-15

## 2023-03-15 ENCOUNTER — HOSPITAL ENCOUNTER (EMERGENCY)
Age: 28
Discharge: HOME | End: 2023-03-15
Payer: MEDICAID

## 2023-03-15 VITALS
HEART RATE: 90 BPM | SYSTOLIC BLOOD PRESSURE: 125 MMHG | DIASTOLIC BLOOD PRESSURE: 78 MMHG | TEMPERATURE: 97 F | OXYGEN SATURATION: 99 % | BODY MASS INDEX: 34.15 KG/M2 | HEIGHT: 64 IN | RESPIRATION RATE: 16 BRPM | WEIGHT: 200 LBS

## 2023-03-15 DIAGNOSIS — N30.00 ACUTE CYSTITIS WITHOUT HEMATURIA: Primary | ICD-10-CM

## 2023-03-15 LAB
ALBUMIN SERPL-MCNC: 4.3 G/DL (ref 3.4–5)
ALBUMIN/GLOB SERPL: 1.2 {RATIO} (ref 1.1–2.2)
ALP SERPL-CCNC: 82 U/L (ref 40–129)
ALT SERPL-CCNC: 10 U/L (ref 10–40)
ANION GAP SERPL CALCULATED.3IONS-SCNC: 11 MMOL/L (ref 3–16)
AST SERPL-CCNC: 11 U/L (ref 15–37)
BACTERIA URNS QL MICRO: ABNORMAL /HPF
BASOPHILS # BLD: 0.1 K/UL (ref 0–0.2)
BASOPHILS NFR BLD: 0.6 %
BILIRUB SERPL-MCNC: 0.4 MG/DL (ref 0–1)
BILIRUB UR QL STRIP.AUTO: NEGATIVE
BUN SERPL-MCNC: 6 MG/DL (ref 7–20)
CALCIUM SERPL-MCNC: 9.3 MG/DL (ref 8.3–10.6)
CHARACTER UR: ABNORMAL
CHLORIDE SERPL-SCNC: 100 MMOL/L (ref 99–110)
CLARITY UR: ABNORMAL
CO2 SERPL-SCNC: 23 MMOL/L (ref 21–32)
COLOR UR: YELLOW
CREAT SERPL-MCNC: 0.8 MG/DL (ref 0.6–1.1)
DEPRECATED RDW RBC AUTO: 13.8 % (ref 12.4–15.4)
EOSINOPHIL # BLD: 0 K/UL (ref 0–0.6)
EOSINOPHIL NFR BLD: 0.4 %
EPI CELLS #/AREA URNS HPF: ABNORMAL /HPF (ref 0–5)
GFR SERPLBLD CREATININE-BSD FMLA CKD-EPI: >60 ML/MIN/{1.73_M2}
GLUCOSE SERPL-MCNC: 104 MG/DL (ref 70–99)
GLUCOSE UR STRIP.AUTO-MCNC: NEGATIVE MG/DL
HCG SERPL QL: NEGATIVE
HCT VFR BLD AUTO: 41.1 % (ref 36–48)
HGB BLD-MCNC: 14.2 G/DL (ref 12–16)
HGB UR QL STRIP.AUTO: ABNORMAL
KETONES UR STRIP.AUTO-MCNC: NEGATIVE MG/DL
LACTATE BLDV-SCNC: 0.9 MMOL/L (ref 0.4–2)
LEUKOCYTE ESTERASE UR QL STRIP.AUTO: ABNORMAL
LIPASE SERPL-CCNC: 13 U/L (ref 13–60)
LYMPHOCYTES # BLD: 1.6 K/UL (ref 1–5.1)
LYMPHOCYTES NFR BLD: 15.3 %
MCH RBC QN AUTO: 31.2 PG (ref 26–34)
MCHC RBC AUTO-ENTMCNC: 34.5 G/DL (ref 31–36)
MCV RBC AUTO: 90.3 FL (ref 80–100)
MONOCYTES # BLD: 0.7 K/UL (ref 0–1.3)
MONOCYTES NFR BLD: 7 %
NEUTROPHILS # BLD: 7.9 K/UL (ref 1.7–7.7)
NEUTROPHILS NFR BLD: 76.7 %
NITRITE UR QL STRIP.AUTO: NEGATIVE
PH UR STRIP.AUTO: 7 [PH] (ref 5–8)
PLATELET # BLD AUTO: 217 K/UL (ref 135–450)
PMV BLD AUTO: 8.1 FL (ref 5–10.5)
POTASSIUM SERPL-SCNC: 3.8 MMOL/L (ref 3.5–5.1)
PROT SERPL-MCNC: 7.8 G/DL (ref 6.4–8.2)
PROT UR STRIP.AUTO-MCNC: 100 MG/DL
RBC # BLD AUTO: 4.55 M/UL (ref 4–5.2)
RBC #/AREA URNS HPF: ABNORMAL /HPF (ref 0–4)
SODIUM SERPL-SCNC: 134 MMOL/L (ref 136–145)
SP GR UR STRIP.AUTO: 1.01 (ref 1–1.03)
UA COMPLETE W REFLEX CULTURE PNL UR: YES
UA DIPSTICK W REFLEX MICRO PNL UR: YES
URN SPEC COLLECT METH UR: ABNORMAL
UROBILINOGEN UR STRIP-ACNC: 2 E.U./DL
WBC # BLD AUTO: 10.2 K/UL (ref 4–11)
WBC #/AREA URNS HPF: >100 /HPF (ref 0–5)

## 2023-03-15 PROCEDURE — 80053 COMPREHEN METABOLIC PANEL: CPT

## 2023-03-15 PROCEDURE — 87186 SC STD MICRODIL/AGAR DIL: CPT

## 2023-03-15 PROCEDURE — 99284 EMERGENCY DEPT VISIT MOD MDM: CPT

## 2023-03-15 PROCEDURE — 87086 URINE CULTURE/COLONY COUNT: CPT

## 2023-03-15 PROCEDURE — 96375 TX/PRO/DX INJ NEW DRUG ADDON: CPT

## 2023-03-15 PROCEDURE — 87077 CULTURE AEROBIC IDENTIFY: CPT

## 2023-03-15 PROCEDURE — 74176 CT ABD & PELVIS W/O CONTRAST: CPT

## 2023-03-15 PROCEDURE — 83690 ASSAY OF LIPASE: CPT

## 2023-03-15 PROCEDURE — 6360000002 HC RX W HCPCS: Performed by: NURSE PRACTITIONER

## 2023-03-15 PROCEDURE — 96374 THER/PROPH/DIAG INJ IV PUSH: CPT

## 2023-03-15 PROCEDURE — 85025 COMPLETE CBC W/AUTO DIFF WBC: CPT

## 2023-03-15 PROCEDURE — 36415 COLL VENOUS BLD VENIPUNCTURE: CPT

## 2023-03-15 PROCEDURE — 83605 ASSAY OF LACTIC ACID: CPT

## 2023-03-15 PROCEDURE — 6370000000 HC RX 637 (ALT 250 FOR IP): Performed by: NURSE PRACTITIONER

## 2023-03-15 PROCEDURE — 84703 CHORIONIC GONADOTROPIN ASSAY: CPT

## 2023-03-15 PROCEDURE — 81001 URINALYSIS AUTO W/SCOPE: CPT

## 2023-03-15 RX ORDER — KETOROLAC TROMETHAMINE 30 MG/ML
15 INJECTION, SOLUTION INTRAMUSCULAR; INTRAVENOUS ONCE
Status: COMPLETED | OUTPATIENT
Start: 2023-03-15 | End: 2023-03-15

## 2023-03-15 RX ORDER — CEFUROXIME AXETIL 500 MG/1
500 TABLET ORAL 2 TIMES DAILY
Qty: 20 TABLET | Refills: 0 | Status: SHIPPED | OUTPATIENT
Start: 2023-03-15 | End: 2023-03-15 | Stop reason: SDUPTHER

## 2023-03-15 RX ORDER — CEFUROXIME AXETIL 500 MG/1
500 TABLET ORAL 2 TIMES DAILY
Qty: 20 TABLET | Refills: 0 | Status: SHIPPED | OUTPATIENT
Start: 2023-03-15 | End: 2023-03-25

## 2023-03-15 RX ORDER — ONDANSETRON 2 MG/ML
4 INJECTION INTRAMUSCULAR; INTRAVENOUS ONCE
Status: COMPLETED | OUTPATIENT
Start: 2023-03-15 | End: 2023-03-15

## 2023-03-15 RX ORDER — CEFUROXIME AXETIL 250 MG/1
500 TABLET ORAL EVERY 12 HOURS SCHEDULED
Status: DISCONTINUED | OUTPATIENT
Start: 2023-03-15 | End: 2023-03-15 | Stop reason: HOSPADM

## 2023-03-15 RX ADMIN — ONDANSETRON 4 MG: 2 INJECTION INTRAMUSCULAR; INTRAVENOUS at 17:00

## 2023-03-15 RX ADMIN — CEFUROXIME AXETIL 500 MG: 250 TABLET ORAL at 19:53

## 2023-03-15 RX ADMIN — KETOROLAC TROMETHAMINE 15 MG: 30 INJECTION, SOLUTION INTRAMUSCULAR at 17:01

## 2023-03-15 ASSESSMENT — ENCOUNTER SYMPTOMS
COUGH: 0
RHINORRHEA: 0
SORE THROAT: 0
ABDOMINAL PAIN: 1
DIARRHEA: 0
SHORTNESS OF BREATH: 0
BLOOD IN STOOL: 0
EYE PAIN: 0
NAUSEA: 1
BACK PAIN: 0
VOMITING: 0

## 2023-03-15 ASSESSMENT — LIFESTYLE VARIABLES
HOW MANY STANDARD DRINKS CONTAINING ALCOHOL DO YOU HAVE ON A TYPICAL DAY: PATIENT DOES NOT DRINK
HOW OFTEN DO YOU HAVE A DRINK CONTAINING ALCOHOL: NEVER

## 2023-03-15 ASSESSMENT — PAIN DESCRIPTION - LOCATION
LOCATION: ABDOMEN;BACK
LOCATION: FLANK

## 2023-03-15 ASSESSMENT — PAIN - FUNCTIONAL ASSESSMENT: PAIN_FUNCTIONAL_ASSESSMENT: 0-10

## 2023-03-15 ASSESSMENT — PAIN SCALES - GENERAL
PAINLEVEL_OUTOF10: 8
PAINLEVEL_OUTOF10: 8

## 2023-03-15 ASSESSMENT — PAIN DESCRIPTION - ORIENTATION: ORIENTATION: LEFT

## 2023-03-15 ASSESSMENT — PAIN DESCRIPTION - DESCRIPTORS: DESCRIPTORS: ACHING

## 2023-03-15 NOTE — TELEPHONE ENCOUNTER
The patient was scheduled for a VV but needed to be evaluated at the ER. She has a fever, flank pain, vomiting, dysuria x 4 days, fatigue and abd pain. Recommend ER to rule out pyelonephritis. Patient verbalized understanding.

## 2023-03-15 NOTE — ED PROVIDER NOTES
[address] ED  EMERGENCY DEPARTMENT ENCOUNTER        Pt Name: Thomas Moarn  MRN: [id number]  [place] 1995  Date of evaluation: 3/15/2023  Provider: CHIKI Jameson CNP  PCP: Rachel Peterson DO  Note Started: 4:51 PM EDT 3/15/23      OMAIRA. I have evaluated this patient. My supervising physician was available for consultation. CHIEF COMPLAINT       Chief Complaint   Patient presents with    Urinary Pain     Patient reports difficultly/pain urinating, left ABD and flank pain and fever x1week. HISTORY OF PRESENT ILLNESS: 1 or more Elements     History From: Dysuria    Limitations to history : None    Social Determinants Significantly Affecting Health : None    Chief Complaint: Burning with urination  Flank pain    Thomas Moran is a 32 y.o. female who presents to the emergency department symptoms of burning with urination and left-sided flank pain. Patient reported that she began with symptoms approximately 3 days ago. Reported that she had general body aches as well. Concern for fever. No documented fever. Reported mild nausea but no vomiting. No other acute complaints. States otherwise feeling well. Nursing Notes were all reviewed and agreed with or any disagreements were addressed in the HPI. REVIEW OF SYSTEMS :      Review of Systems   Constitutional:  Negative for chills, diaphoresis and fever. HENT:  Negative for congestion, ear pain, rhinorrhea and sore throat. Eyes:  Negative for pain and visual disturbance. Respiratory:  Negative for cough and shortness of breath. Cardiovascular:  Negative for chest pain and leg swelling. Gastrointestinal:  Positive for abdominal pain and nausea. Negative for blood in stool, diarrhea and vomiting. Genitourinary:  Positive for dysuria and frequency. Negative for difficulty urinating and flank pain. Musculoskeletal:  Negative for back pain and neck pain. Skin:  Negative for rash and wound. Neurological:  Negative for dizziness and light-headedness. Positives and Pertinent negatives as per HPI.      SURGICAL HISTORY     Past Surgical History:   Procedure Laterality Date     SECTION       SECTION       SECTION      HI  DELIVERY ONLY N/A 10/6/2018     SECTION performed by Brian Raza MD at [facility] OR    TONSILLECTOMY      TYMPANOSTOMY TUBE PLACEMENT         [address]       Discharge Medication List as of 3/15/2023  7:50 PM        CONTINUE these medications which have NOT CHANGED    Details   hydrOXYzine HCl (ATARAX) 50 MG tablet TAKE 1 TABLET BY MOUTH EVERY 8 HOURS AS NEEDED FOR ANXIETY (AS NEEDED), Disp-90 tablet, R-1Normal      sertraline (ZOLOFT) 50 MG tablet TAKE 1 TABLET BY MOUTH DAILY, Disp-30 tablet, R-5Normal      traZODone (DESYREL) 100 MG tablet TAKE 1 TABLET BY MOUTH NIGHTLY, Disp-90 tablet, R-0Normal             ALLERGIES     Red dye    FAMILYHISTORY       Family History   Problem Relation Age of Onset    Heart Disease Father     Other Mother         polyp on ovary    Heart Disease Maternal Grandmother     Alcohol Abuse Maternal Grandfather     Heart Disease Maternal Grandfather         SOCIAL HISTORY       Social History     Tobacco Use    Smoking status: Every Day     Packs/day: 0.50     Years: 11.00     Pack years: 5.50     Types: Cigarettes    Smokeless tobacco: Never   Vaping Use    Vaping Use: Never used   Substance Use Topics    Alcohol use: Not Currently    Drug use: Yes     Types: Marijuana (Weed)     Comment: occasionally       SCREENINGS        [place] Coma Scale  Eye Opening: Spontaneous  Best Verbal Response: Oriented  Best Motor Response: Obeys commands  [place] Coma Scale Score: 15                CIWA Assessment  BP: 125/78  Heart Rate: 90           PHYSICAL EXAM  1 or more Elements     ED Triage Vitals [03/15/23 1639]   BP Temp Temp Source Heart Rate Resp SpO2 Height Weight   138/80 97 °F (36.1 °C) Oral 99 18 99 % 5' 4\" (1.626 m) 200 lb (90.7 kg)       Physical Exam  Vitals and nursing note reviewed. Constitutional:       Appearance: Normal appearance. She is not toxic-appearing or diaphoretic. HENT:      Head: Normocephalic and atraumatic. Nose: Nose normal.   Eyes:      General:         Right eye: No discharge. Left eye: No discharge. Cardiovascular:      Rate and Rhythm: Normal rate and regular rhythm. Pulses: Normal pulses. Heart sounds: No murmur heard. Pulmonary:      Effort: Pulmonary effort is normal. No respiratory distress. Breath sounds: No wheezing or rhonchi. Abdominal:      General: Abdomen is flat. Bowel sounds are normal.      Palpations: Abdomen is soft. Tenderness: There is abdominal tenderness in the left upper quadrant and left lower quadrant. There is no guarding or rebound. Negative signs include Herrmann's sign and McBurney's sign. Musculoskeletal:         General: Normal range of motion. Cervical back: Normal range of motion and neck supple. Skin:     General: Skin is warm and dry. Neurological:      General: No focal deficit present. Mental Status: She is alert and oriented to person, place, and time.    Psychiatric:         Mood and Affect: Mood normal.         Behavior: Behavior normal.         DIAGNOSTIC RESULTS   LABS:    Labs Reviewed   URINALYSIS WITH REFLEX TO CULTURE - Abnormal; Notable for the following components:       Result Value    Clarity, UA SL CLOUDY (*)     Blood, Urine SMALL (*)     Protein,  (*)     Urobilinogen, Urine 2.0 (*)     Leukocyte Esterase, Urine LARGE (*)     All other components within normal limits   CBC WITH AUTO DIFFERENTIAL - Abnormal; Notable for the following components:    Neutrophils Absolute 7.9 (*)     All other components within normal limits   COMPREHENSIVE METABOLIC PANEL W/ REFLEX TO MG FOR LOW K - Abnormal; Notable for the following components:    Sodium 134 (*)     Glucose 104 (*)     BUN 6 (*) AST 11 (*)     All other components within normal limits   MICROSCOPIC URINALYSIS - Abnormal; Notable for the following components:    WBC, UA >100 (*)     RBC, UA 5-10 (*)     Epithelial Cells, UA 6-10 (*)     Bacteria, UA 1+ (*)     All other components within normal limits   CULTURE, URINE   LIPASE   HCG, SERUM, QUALITATIVE   LACTIC ACID       When ordered only abnormal lab results are displayed. All other labs were within normal range or not returned as of this dictation. EKG: When ordered, EKG's are interpreted by the Emergency Department Physician in the absence of a cardiologist.  Please see their note for interpretation of EKG. RADIOLOGY:   Non-plain film images such as CT, Ultrasound and MRI are read by the radiologist. Plain radiographic images are visualized and preliminarily interpreted by the ED Provider with the below findings:        Interpretation per the Radiologist below, if available at the time of this note:    CT ABDOMEN PELVIS WO CONTRAST Additional Contrast? None   Final Result   No acute abdominal or pelvic findings           No results found. No results found. PROCEDURES   Unless otherwise noted below, none     Procedures    CRITICAL CARE TIME (.cctime)       PAST MEDICAL HISTORY      has a past medical history of Anemia, Depression, Gastric ulcer, unspecified as acute or chronic, without mention of hemorrhage, perforation, or obstruction, Rh incompatibility, and Thyroid disease.      EMERGENCY DEPARTMENT COURSE and DIFFERENTIAL DIAGNOSIS/MDM:   Vitals:    Vitals:    03/15/23 1639 03/15/23 1956   BP: 138/80 125/78   Pulse: 99 90   Resp: 18 16   Temp: 97 °F (36.1 °C)    TempSrc: Oral    SpO2: 99%    Weight: 200 lb (90.7 kg)    Height: 5' 4\" (1.626 m)        Patient was given the following medications:  Medications   ondansetron (ZOFRAN) injection 4 mg (4 mg IntraVENous Given 3/15/23 1700)   ketorolac (TORADOL) injection 15 mg (15 mg IntraVENous Given 3/15/23 1701) Is this patient to be included in the SEP-1 Core Measure due to severe sepsis or septic shock? No   Exclusion criteria - the patient is NOT to be included for SEP-1 Core Measure due to: Infection is not suspected        Chronic Conditions affecting care:    has a past medical history of Anemia, Depression, Gastric ulcer, unspecified as acute or chronic, without mention of hemorrhage, perforation, or obstruction, Rh incompatibility, and Thyroid disease. CONSULTS: (Who and What was discussed)  None          Records Reviewed (External and Source)     CC/HPI Summary, DDx, ED Course, and Reassessment: Patient here in the emergency department symptoms of dysuria and burning with urination. Patient states that she began with symptoms approximate 1 week ago and has been tolerating symptoms since. Reported that she does have symptoms of left flank pain as well. Concern for UTI/kidney stone/urinary problem. Patient's work-up in the emergency department as well. She does have UTI. Pregnancy test negative. The patient's CT scan of her abdomen pelvis is unremarkable. No evidence of ureteral stone. No evidence of pyelonephritis on CT scan. Laboratory findings are quite unremarkable including lactic normal.  White count normal.  Plan for discharge and follow-up PCP. Ceftin. Urine culture obtained. Disposition Considerations (tests considered but not done, Admit vs D/C, Shared Decision Making, Pt Expectation of Test or Tx.):       I am the Primary Clinician of Record. FINAL IMPRESSION      1.  Acute cystitis without hematuria          DISPOSITION/PLAN     DISPOSITION Decision To Discharge 03/15/2023 07:42:17 PM      PATIENT REFERRED TO:  DO Francesco Knapp 46  [phone]    Schedule an appointment as soon as possible for a visit       [address]  [address] [facility]  Go to   If symptoms worsen    DISCHARGE MEDICATIONS:  Discharge Medication List as of 3/15/2023  7:50 PM          DISCONTINUED MEDICATIONS:  Discharge Medication List as of 3/15/2023  7:50 PM                 (Please note that portions of this note were completed with a voice recognition program.  Efforts were made to edit the dictations but occasionally words are mis-transcribed.)    CHIKI Arreola CNP (electronically signed)       CHIKI Arreola CNP  03/16/23 7290

## 2023-03-17 LAB
BACTERIA UR CULT: ABNORMAL
ORGANISM: ABNORMAL

## 2023-03-31 RX ORDER — TRAZODONE HYDROCHLORIDE 100 MG/1
100 TABLET ORAL NIGHTLY
Qty: 90 TABLET | Refills: 0 | OUTPATIENT
Start: 2023-03-31

## 2023-05-08 DIAGNOSIS — F41.9 ANXIETY: ICD-10-CM

## 2023-05-08 DIAGNOSIS — R45.89 DEPRESSED MOOD: Primary | ICD-10-CM

## 2023-05-08 RX ORDER — HYDROXYZINE 50 MG/1
TABLET, FILM COATED ORAL
Qty: 90 TABLET | Refills: 1 | Status: SHIPPED | OUTPATIENT
Start: 2023-05-08

## 2023-07-19 RX ORDER — HYDROXYZINE 50 MG/1
TABLET, FILM COATED ORAL
Qty: 90 TABLET | Refills: 0 | Status: SHIPPED | OUTPATIENT
Start: 2023-07-19

## 2023-07-19 NOTE — TELEPHONE ENCOUNTER
Pt needs to schedule a Physical and have fasting labs done before her visit. Please try again to contact her and send letter if needed. No further refills after this until an appt is made. Thank you.

## 2023-08-16 RX ORDER — HYDROXYZINE 50 MG/1
TABLET, FILM COATED ORAL
Qty: 90 TABLET | Refills: 0 | Status: SHIPPED | OUTPATIENT
Start: 2023-08-16

## 2023-08-16 RX ORDER — HYDROXYZINE 50 MG/1
TABLET, FILM COATED ORAL
Qty: 90 TABLET | Refills: 0 | OUTPATIENT
Start: 2023-08-16

## 2023-08-16 NOTE — TELEPHONE ENCOUNTER
Left message for patient to schedule appointment , called pharmacy and got a different number and confirmed patient has been picking up her prescriptions . Called new number and left message . Number changed in patient chart voicemail on number is Zoila .

## 2023-08-16 NOTE — TELEPHONE ENCOUNTER
We have attempted to contact patient in the past unsuccessfully for her to schedule a visit. Will fill this last time but please try to call again as no further refills will be done until she schedules. Her last visit was in January 2022. If you cannot get a hold of patient please call her pharmacy and ask if she has been picking up her refills.   Thank you

## 2023-09-15 RX ORDER — HYDROXYZINE 50 MG/1
TABLET, FILM COATED ORAL
Qty: 90 TABLET | Refills: 0 | OUTPATIENT
Start: 2023-09-15

## 2024-08-21 ENCOUNTER — OFFICE VISIT (OUTPATIENT)
Dept: FAMILY MEDICINE CLINIC | Age: 29
End: 2024-08-21
Payer: MEDICAID

## 2024-08-21 VITALS
DIASTOLIC BLOOD PRESSURE: 74 MMHG | BODY MASS INDEX: 34.04 KG/M2 | OXYGEN SATURATION: 95 % | HEART RATE: 84 BPM | SYSTOLIC BLOOD PRESSURE: 105 MMHG | TEMPERATURE: 98.4 F | HEIGHT: 64 IN | RESPIRATION RATE: 16 BRPM | WEIGHT: 199.4 LBS

## 2024-08-21 DIAGNOSIS — R00.2 PALPITATIONS: Primary | ICD-10-CM

## 2024-08-21 DIAGNOSIS — H60.391 ACUTE INFECTIVE OTITIS EXTERNA, RIGHT: ICD-10-CM

## 2024-08-21 DIAGNOSIS — G43.909 MIGRAINE WITHOUT STATUS MIGRAINOSUS, NOT INTRACTABLE, UNSPECIFIED MIGRAINE TYPE: ICD-10-CM

## 2024-08-21 DIAGNOSIS — R45.89 DEPRESSED MOOD: ICD-10-CM

## 2024-08-21 DIAGNOSIS — J34.89 NASAL DRAINAGE: ICD-10-CM

## 2024-08-21 DIAGNOSIS — H66.91 RIGHT OTITIS MEDIA, UNSPECIFIED OTITIS MEDIA TYPE: ICD-10-CM

## 2024-08-21 PROCEDURE — 99214 OFFICE O/P EST MOD 30 MIN: CPT | Performed by: FAMILY MEDICINE

## 2024-08-21 PROCEDURE — 93000 ELECTROCARDIOGRAM COMPLETE: CPT | Performed by: FAMILY MEDICINE

## 2024-08-21 RX ORDER — PANTOPRAZOLE SODIUM 40 MG/1
40 TABLET, DELAYED RELEASE ORAL 2 TIMES DAILY
COMMUNITY
Start: 2022-05-23 | End: 2024-08-21 | Stop reason: SDUPTHER

## 2024-08-21 RX ORDER — CIPROFLOXACIN AND DEXAMETHASONE 3; 1 MG/ML; MG/ML
4 SUSPENSION/ DROPS AURICULAR (OTIC) 2 TIMES DAILY
Qty: 1 EACH | Refills: 0 | Status: SHIPPED | OUTPATIENT
Start: 2024-08-21

## 2024-08-21 RX ORDER — PANTOPRAZOLE SODIUM 40 MG/1
40 TABLET, DELAYED RELEASE ORAL 2 TIMES DAILY
Qty: 180 TABLET | Refills: 2 | Status: SHIPPED | OUTPATIENT
Start: 2024-08-21

## 2024-08-21 RX ORDER — AMOXICILLIN AND CLAVULANATE POTASSIUM 875; 125 MG/1; MG/1
1 TABLET, FILM COATED ORAL 2 TIMES DAILY
Qty: 10 TABLET | Refills: 0 | Status: SHIPPED | OUTPATIENT
Start: 2024-08-21 | End: 2024-08-26

## 2024-08-21 RX ORDER — FLUTICASONE PROPIONATE 50 MCG
1 SPRAY, SUSPENSION (ML) NASAL DAILY
Qty: 1 EACH | Refills: 0 | Status: SHIPPED | OUTPATIENT
Start: 2024-08-21

## 2024-08-21 SDOH — ECONOMIC STABILITY: FOOD INSECURITY: WITHIN THE PAST 12 MONTHS, YOU WORRIED THAT YOUR FOOD WOULD RUN OUT BEFORE YOU GOT MONEY TO BUY MORE.: NEVER TRUE

## 2024-08-21 SDOH — ECONOMIC STABILITY: FOOD INSECURITY: WITHIN THE PAST 12 MONTHS, THE FOOD YOU BOUGHT JUST DIDN'T LAST AND YOU DIDN'T HAVE MONEY TO GET MORE.: NEVER TRUE

## 2024-08-21 SDOH — ECONOMIC STABILITY: INCOME INSECURITY: HOW HARD IS IT FOR YOU TO PAY FOR THE VERY BASICS LIKE FOOD, HOUSING, MEDICAL CARE, AND HEATING?: NOT HARD AT ALL

## 2024-08-21 ASSESSMENT — PATIENT HEALTH QUESTIONNAIRE - PHQ9
5. POOR APPETITE OR OVEREATING: NOT AT ALL
7. TROUBLE CONCENTRATING ON THINGS, SUCH AS READING THE NEWSPAPER OR WATCHING TELEVISION: MORE THAN HALF THE DAYS
9. THOUGHTS THAT YOU WOULD BE BETTER OFF DEAD, OR OF HURTING YOURSELF: NOT AT ALL
4. FEELING TIRED OR HAVING LITTLE ENERGY: SEVERAL DAYS
3. TROUBLE FALLING OR STAYING ASLEEP: MORE THAN HALF THE DAYS
2. FEELING DOWN, DEPRESSED OR HOPELESS: MORE THAN HALF THE DAYS
SUM OF ALL RESPONSES TO PHQ QUESTIONS 1-9: 12
10. IF YOU CHECKED OFF ANY PROBLEMS, HOW DIFFICULT HAVE THESE PROBLEMS MADE IT FOR YOU TO DO YOUR WORK, TAKE CARE OF THINGS AT HOME, OR GET ALONG WITH OTHER PEOPLE: VERY DIFFICULT
SUM OF ALL RESPONSES TO PHQ QUESTIONS 1-9: 12
SUM OF ALL RESPONSES TO PHQ QUESTIONS 1-9: 12
1. LITTLE INTEREST OR PLEASURE IN DOING THINGS: MORE THAN HALF THE DAYS
SUM OF ALL RESPONSES TO PHQ9 QUESTIONS 1 & 2: 4
8. MOVING OR SPEAKING SO SLOWLY THAT OTHER PEOPLE COULD HAVE NOTICED. OR THE OPPOSITE, BEING SO FIGETY OR RESTLESS THAT YOU HAVE BEEN MOVING AROUND A LOT MORE THAN USUAL: NOT AT ALL
SUM OF ALL RESPONSES TO PHQ QUESTIONS 1-9: 12
6. FEELING BAD ABOUT YOURSELF - OR THAT YOU ARE A FAILURE OR HAVE LET YOURSELF OR YOUR FAMILY DOWN: NEARLY EVERY DAY

## 2024-08-21 ASSESSMENT — COLUMBIA-SUICIDE SEVERITY RATING SCALE - C-SSRS
6. HAVE YOU EVER DONE ANYTHING, STARTED TO DO ANYTHING, OR PREPARED TO DO ANYTHING TO END YOUR LIFE?: YES
1. WITHIN THE PAST MONTH, HAVE YOU WISHED YOU WERE DEAD OR WISHED YOU COULD GO TO SLEEP AND NOT WAKE UP?: NO
2. HAVE YOU ACTUALLY HAD ANY THOUGHTS OF KILLING YOURSELF?: NO
7. DID THIS OCCUR IN THE LAST THREE MONTHS: NO

## 2024-08-21 NOTE — PROGRESS NOTES
Comments: Erythremic right canal, bulging right TM  Cardiovascular:      Rate and Rhythm: Normal rate and regular rhythm.      Pulses: Normal pulses.      Heart sounds: Normal heart sounds.   Psychiatric:         Mood and Affect: Mood normal.         Behavior: Behavior normal.         Thought Content: Thought content normal.         Plan   Diagnosis Orders   1. Palpitations  Sebastian Chambers MD, Cardiology, [facility]    Lipid Panel    CBC with Auto Differential    Comprehensive Metabolic Panel    EKG 12 Lead      2. Depressed mood  Restart sertraline (ZOLOFT) 50 MG tablet    TSH    T4, Free    Non BS - External Referral To Social Work      3. Migraine without status migrainosus, not intractable, unspecified migraine type  Currently not present, pt will have eye exam      4. Nasal drainage  fluticasone (FLONASE) 50 MCG/ACT nasal spray      5. Acute infective otitis externa, right  ciprofloxacin-dexAMETHasone (CIPRODEX) 0.3-0.1 % otic suspension      6. Right otitis media, unspecified otitis media type  amoxicillin-clavulanate (AUGMENTIN) 875-125 MG per tablet          Return in about 3 weeks (around 9/11/2024) for Depression F/U Lab results F/U.

## 2024-11-20 ENCOUNTER — OFFICE VISIT (OUTPATIENT)
Dept: CARDIOLOGY CLINIC | Age: 29
End: 2024-11-20
Payer: MEDICAID

## 2024-11-20 ENCOUNTER — TELEPHONE (OUTPATIENT)
Dept: CARDIOLOGY CLINIC | Age: 29
End: 2024-11-20

## 2024-11-20 VITALS
BODY MASS INDEX: 34.62 KG/M2 | WEIGHT: 202.8 LBS | HEIGHT: 64 IN | DIASTOLIC BLOOD PRESSURE: 72 MMHG | SYSTOLIC BLOOD PRESSURE: 104 MMHG

## 2024-11-20 DIAGNOSIS — Z72.0 TOBACCO ABUSE: ICD-10-CM

## 2024-11-20 DIAGNOSIS — R07.9 CHEST PAIN, UNSPECIFIED TYPE: Primary | ICD-10-CM

## 2024-11-20 DIAGNOSIS — Z82.49 FAMILY HISTORY OF HEART DISEASE: ICD-10-CM

## 2024-11-20 DIAGNOSIS — R00.2 PALPITATIONS: ICD-10-CM

## 2024-11-20 PROCEDURE — 99244 OFF/OP CNSLTJ NEW/EST MOD 40: CPT | Performed by: INTERNAL MEDICINE

## 2024-11-20 NOTE — PROGRESS NOTES
[facility] [place]   Cardiovascular Evaluation    PATIENT: Zoila Fontenot  DATE: 2024  MRN: [id number]  CSN: [id number]  : 1995    Primary Care Doctor/Referring provider: Vitor Erazo DO, No admitting provider for patient encounter.     Reason for evaluation/Chief complaint:   New Patient, Irregular Heart Beat, Dizziness, and Chest Pain    Subjective:    History of present illness on initial date of evaluation:   Zoila Fontenot is a 29 y.o. female patient who presents as a new patient for cardiology evaluation and treatment for chest pain and palpitations. She has a medical history significant of tobacco abuse. She was referred by her PCP Vitor Erazo DO. An EKG was performed 24 SR 77 BPM.    Today she reports presents with her sister. she is have chest pain and palpitations. Sister reports she drinks coffee all day long and plays video games during the day all night long. She reports overall she is anxious. Her mother and father has congestive heart failure. She lives her sister and does not have a job. She does not typically leave the house. She reports chest pain occurs atleast 1-2 times per day. She is a smoker since the age of 15. She uses marijuana. She does not drink alcohol did have a history of drinking frequently over a 3 year period no longer drinks. She has hand numbness at times. She reports she is stressed quite frequently.       Patient Active Problem List   Diagnosis    Previous  section     deliv due to previous difficult deliv, deliv, curr hospitaliz    Major depressive disorder, recurrent (HCC)    PTSD (post-traumatic stress disorder)    Changes in vision    Alcohol abuse    Microscopic hematuria    Tobacco abuse    Chest pain    Palpitations    Family history of heart disease         Cardiac Testing: I have reviewed the findings below.  EKG:  ECHO:   STRESS TEST:  CATH:  BYPASS:  VASCULAR:    Past Medical History:   has a

## 2024-11-20 NOTE — TELEPHONE ENCOUNTER
SCREENING QUESTIONS:     Do you have a history of cardiovascular disease, this includes any of the following- heart stent and/or open-heart surgery, stroke or abdominal aortic aneurysm? No (If the answer is yes please do not schedule)     Are you currently following up with a cardiologist? Yes     If no, would you like our office to contact you? Yes        Do you have a family history of abdominal aortic aneurysm, heart attack or stroke? Yes    Do you have high cholesterol? No    Do you have high blood pressure? No    Do you have diabetes? No    Do you currently smoke or are you a former smoker? Yes      WRAP UP:     PLEASE CHECK EACH ONE     Referred by: Dr.Vanshipal Nunez    [x] Scheduled on 1/08/25 at 10:20 am at the [facility]    [x] Instructions given to patient- Nothing to eat or drink 8 hrs prior to appointment and wear loose, comfortable clothing    [] Not scheduled due to previous history themselves    [] Sent to RN pool, has a cardiac history and is not following a cardiologist, would like a follow call

## 2024-11-20 NOTE — PATIENT INSTRUCTIONS
Your provider has ordered testing for further evaluation.  An order/prescription has been included in your paper work.   To schedule outpatient testing, contact Central Scheduling by calling [address] [place], [place] [address]RAVI ([phone]).     Plan:  1. Order echocardiogram ~ chest pain  2. Order CT calcium score ~ chest pain, family history of heart disease  3. Order vascular screening ~ chest pain, family history of heart disease  ~Nothing to eat or drink 8 hrs prior to appointment and wear loose, comfortable clothing  4. Tobacco Cessation Encouraged  ~ Advised patient to quit and offered support.  Educational material provided to patient.   5. Patient given detailed instructions on addressing diet, regular exercise, weight control, smoking abstention, medication compliance, and stress minimization. The patient was provided written and verbal instructions regarding risk factor modification.    6. Recommend reduction of caffeine intake no more than 1-2 cups of coffee per day  7. Proceed with labs ordered by PCP Vitor Erazo DO will review fasting lipid panel   8. Follow up based on testing results

## 2024-11-25 ENCOUNTER — TELEPHONE (OUTPATIENT)
Dept: CARDIOLOGY CLINIC | Age: 29
End: 2024-11-25

## 2024-11-25 NOTE — TELEPHONE ENCOUNTER
Per stress techAjit prior auth is still pending for echo, pt needs r/s. If pt were to proceed with echo, they would assume financial responsibility. Lvm informing pt to call back to r/s echo as we have not yet receive a prior auth from her ins.

## 2024-11-26 NOTE — TELEPHONE ENCOUNTER
We should receive the prior auth prior to rescheduling testing at this time.     Ajit are you able to inform [facility] [employer]  when the prior authorization has been received? Thank you!

## 2024-12-05 NOTE — TELEPHONE ENCOUNTER
Echo has been approved per insurance. Please schedule pt during validity dates. Thank you,  Validity dates: 11/26/2024 - 1/24/2025

## 2024-12-05 NOTE — TELEPHONE ENCOUNTER
Called pt at [phone], no answer, left vm informing pt that her echo is now approved and to call back to schedule. Pt's echo validity dates are 11/26/24-1/24/25. The echo needs to be scheduled sometime between those dates.

## 2024-12-09 NOTE — TELEPHONE ENCOUNTER
12/9 Second attempt: Called [phone] and lvm advising pt that ECHO has been approved and gave dates that it is approved for.

## 2024-12-11 NOTE — TELEPHONE ENCOUNTER
12/11 Called [phone].  LVM advising pt that insurance approved ECHO for certain time frame. Mailed letter to pt advising of the approval dates

## (undated) DEVICE — SUTURE VCRL SZ 2-0 L36IN ABSRB UD L36MM CT-1 1/2 CIR J945H

## (undated) DEVICE — CATHETER TRAY 16 FR 5 CC FOL ANTIREFLX SAMPLING PRT DOVER

## (undated) DEVICE — 3M™ STERI-STRIP™ COMPOUND BENZOIN TINCTURE 40 BAGS/CARTON 4 CARTONS/CASE C1544: Brand: 3M™ STERI-STRIP™

## (undated) DEVICE — SUTURE CHROMIC GUT SZ 1 L36IN ABSRB BRN L40MM CT 1/2 CIR 915H

## (undated) DEVICE — GLOVE SURG SZ 75 L12IN FNGR THK87MIL WHT LTX FREE

## (undated) DEVICE — SOLUTION IV IRRIG POUR BRL 0.9% SODIUM CHL 2F7124

## (undated) DEVICE — SAFESECURE,SECUREMENT,FOLEY CATH,STERILE: Brand: MEDLINE

## (undated) DEVICE — CHLORAPREP 26ML ORANGE

## (undated) DEVICE — CANISTER, RIGID, 3000CC: Brand: MEDLINE INDUSTRIES, INC.

## (undated) DEVICE — 3M™ MEDIPORE™ H SOFT CLOTH SURGICAL TAPE 2864, 4 INCH X 10 YARD (10CM X 9,14M), 12 ROLLS/CASE: Brand: 3M™ MEDIPORE™

## (undated) DEVICE — 9165 UNIVERSAL PATIENT PLATE: Brand: 3M™

## (undated) DEVICE — TELFA NON-ADHERENT ABSORBENT DRESSING: Brand: TELFA

## (undated) DEVICE — 3M™ STERI-STRIP™ REINFORCED ADHESIVE SKIN CLOSURES, R1547, 1/2 IN X 4 IN (12 MM X 100 MM), 6 STRIPS/ENVELOPE: Brand: 3M™ STERI-STRIP™

## (undated) DEVICE — Device: Brand: PORTEX

## (undated) DEVICE — POOLE SUCTION INSTRUMENT,RIGID: Brand: ARGYLE

## (undated) DEVICE — GAUZE SPONGES,USP TYPE VII GAUZE, 12 PLY: Brand: CURITY

## (undated) DEVICE — COVER LT HNDL BLU PLAS

## (undated) DEVICE — BAG,SPONGE COUNTER,BLUE,50/BX,5BX/CS: Brand: MEDLINE

## (undated) DEVICE — Device

## (undated) DEVICE — SUTURE VCRL SZ 4-0 L27IN ABSRB UD L60MM KS STR REV CUT NDL J662H

## (undated) DEVICE — SUTURE VCRL + SZ 0 L18IN ABSRB UD L36MM CT-1 1/2 CIR VCP840D

## (undated) DEVICE — SPONGE LAP W18XL18IN WHT COT 4 PLY FLD STRUNG RADPQ DISP ST